# Patient Record
Sex: FEMALE | Employment: FULL TIME | URBAN - METROPOLITAN AREA
[De-identification: names, ages, dates, MRNs, and addresses within clinical notes are randomized per-mention and may not be internally consistent; named-entity substitution may affect disease eponyms.]

---

## 2017-03-01 ENCOUNTER — OFFICE VISIT (OUTPATIENT)
Dept: FAMILY MEDICINE CLINIC | Facility: CLINIC | Age: 37
End: 2017-03-01

## 2017-03-01 VITALS
RESPIRATION RATE: 16 BRPM | TEMPERATURE: 98 F | BODY MASS INDEX: 37.68 KG/M2 | WEIGHT: 210 LBS | HEART RATE: 62 BPM | SYSTOLIC BLOOD PRESSURE: 120 MMHG | OXYGEN SATURATION: 98 % | HEIGHT: 62.5 IN | DIASTOLIC BLOOD PRESSURE: 78 MMHG

## 2017-03-01 DIAGNOSIS — J06.9 VIRAL UPPER RESPIRATORY TRACT INFECTION: Primary | ICD-10-CM

## 2017-03-01 DIAGNOSIS — H65.03 BILATERAL ACUTE SEROUS OTITIS MEDIA, RECURRENCE NOT SPECIFIED: ICD-10-CM

## 2017-03-01 PROCEDURE — 99203 OFFICE O/P NEW LOW 30 MIN: CPT | Performed by: NURSE PRACTITIONER

## 2017-03-01 RX ORDER — FLUTICASONE PROPIONATE 50 MCG
2 SPRAY, SUSPENSION (ML) NASAL DAILY
Qty: 1 BOTTLE | Refills: 0 | Status: SHIPPED | OUTPATIENT
Start: 2017-03-01 | End: 2017-03-22

## 2017-03-02 NOTE — PROGRESS NOTES
CHIEF COMPLAINT:   Patient presents with:  Cerumen Impaction: This is a 36 yo famle here to check left ear may have wax Recent URI      HPI:   Eliesermalik Gupta is a 31year old female who presents for upper respiratory symptoms for  1 weeks.  Patient repor auricular lymphadenopathy. ASSESSMENT AND PLAN:   Tu Salazar is a 31year old female who presents with URI eustachian tube dysfunction    ASSESSMENT:   eustachian tube dysfunction  URI      PLAN: Meds as below.   Comfort care as described

## 2017-03-06 ENCOUNTER — TELEPHONE (OUTPATIENT)
Dept: FAMILY MEDICINE CLINIC | Facility: CLINIC | Age: 37
End: 2017-03-06

## 2017-12-18 ENCOUNTER — OFFICE VISIT (OUTPATIENT)
Dept: FAMILY MEDICINE CLINIC | Facility: CLINIC | Age: 37
End: 2017-12-18

## 2017-12-18 VITALS
SYSTOLIC BLOOD PRESSURE: 122 MMHG | OXYGEN SATURATION: 98 % | BODY MASS INDEX: 38.22 KG/M2 | HEIGHT: 62.5 IN | HEART RATE: 84 BPM | DIASTOLIC BLOOD PRESSURE: 80 MMHG | RESPIRATION RATE: 20 BRPM | TEMPERATURE: 98 F | WEIGHT: 213 LBS

## 2017-12-18 DIAGNOSIS — J00 NASOPHARYNGITIS ACUTE: Primary | ICD-10-CM

## 2017-12-18 PROCEDURE — 99213 OFFICE O/P EST LOW 20 MIN: CPT | Performed by: NURSE PRACTITIONER

## 2017-12-18 NOTE — PROGRESS NOTES
CHIEF COMPLAINT:     Patient presents with:  Sinus Problem      HPI:   Anthony Lewis is a 30year old female who presents with complaints of sinus congestion (improving), with swollen \"glands\"      No current outpatient prescriptions on file.    Past Instructions on file for this visit. The patient indicates understanding of these issues and agrees to the plan. The patient is asked to return in 3 days if no improvement or sooner if condition worsens.

## 2018-04-02 ENCOUNTER — OFFICE VISIT (OUTPATIENT)
Dept: FAMILY MEDICINE CLINIC | Facility: CLINIC | Age: 38
End: 2018-04-02

## 2018-04-02 VITALS
TEMPERATURE: 98 F | DIASTOLIC BLOOD PRESSURE: 70 MMHG | OXYGEN SATURATION: 98 % | BODY MASS INDEX: 38 KG/M2 | HEART RATE: 73 BPM | WEIGHT: 213 LBS | SYSTOLIC BLOOD PRESSURE: 120 MMHG

## 2018-04-02 DIAGNOSIS — R21 RASH: Primary | ICD-10-CM

## 2018-04-02 PROCEDURE — 99213 OFFICE O/P EST LOW 20 MIN: CPT | Performed by: FAMILY MEDICINE

## 2018-04-02 RX ORDER — MOMETASONE FUROATE 1 MG/G
1 CREAM TOPICAL 2 TIMES DAILY PRN
Qty: 15 G | Refills: 0 | Status: SHIPPED | OUTPATIENT
Start: 2018-04-02 | End: 2019-03-28

## 2018-04-02 RX ORDER — CHOLECALCIFEROL (VITAMIN D3) 25 MCG
1 TABLET,CHEWABLE ORAL DAILY
COMMUNITY

## 2018-04-02 RX ORDER — CHLORAL HYDRATE 500 MG
1000 CAPSULE ORAL DAILY
COMMUNITY
End: 2020-01-02

## 2018-04-02 NOTE — PATIENT INSTRUCTIONS
Monitor symptoms. Try applying coconut oil and lavender oil to area 2-3 times daily. If no better consider adding claritin/zyrtec or trying the topical steroid cream twice daily.    Consider looking into [facility] website or [facility] paleo c

## 2018-04-03 NOTE — PROGRESS NOTES
CHIEF COMPLAINT:   Patient presents with:  Rash Skin Problem (integumentary)         HPI:   Kennedy Marques is a 30year old female who presents for evaluation of a rash. Per patient rash started in the past 3 weeks.  Rash has been persistent or slightly enlargement of the lymph nodes. NEURO: Denies abnormal sensation, tingling of the skin, or numbness.       EXAM:   /70   Pulse 73   Temp 98.4 °F (36.9 °C) (Oral)   Wt 213 lb   SpO2 98%   BMI 38.34 kg/m²   GENERAL: well developed, well nourished,in no

## 2019-04-15 ENCOUNTER — HOSPITAL ENCOUNTER (OUTPATIENT)
Age: 39
Discharge: HOME | End: 2019-04-15
Attending: FAMILY MEDICINE
Payer: COMMERCIAL

## 2019-04-15 VITALS
RESPIRATION RATE: 16 BRPM | HEART RATE: 72 BPM | SYSTOLIC BLOOD PRESSURE: 132 MMHG | TEMPERATURE: 98 F | DIASTOLIC BLOOD PRESSURE: 79 MMHG | OXYGEN SATURATION: 100 %

## 2019-04-15 DIAGNOSIS — J02.0 STREP PHARYNGITIS: Primary | ICD-10-CM

## 2019-04-15 PROCEDURE — 99213 OFFICE O/P EST LOW 20 MIN: CPT

## 2019-04-15 PROCEDURE — 99204 OFFICE O/P NEW MOD 45 MIN: CPT

## 2019-04-15 PROCEDURE — 87430 STREP A AG IA: CPT

## 2019-04-15 RX ORDER — AMOXICILLIN 875 MG/1
875 TABLET, COATED ORAL 2 TIMES DAILY
Qty: 20 TABLET | Refills: 0 | Status: SHIPPED | OUTPATIENT
Start: 2019-04-15 | End: 2019-04-25

## 2019-04-15 NOTE — ED PROVIDER NOTES
Patient Seen in: [address]    History   Patient presents with:  Sore Throat    Stated Complaint: TL-Throat    HPI    Patient here with sore throat for 1 days. No travel,no sick contacts .   Patient denies sig shortness non-tender, normal bowel sounds    DDX: strep vs. Viral pharyngitis vs. uri    ED Course     Labs Reviewed   [facility] POCT RAPID STREP - Abnormal; Notable for the following components:       Result Value    POCT Rapid Strep Positive (*)     All other components

## 2019-04-30 ENCOUNTER — HOSPITAL ENCOUNTER (OUTPATIENT)
Age: 39
Discharge: HOME | End: 2019-04-30
Attending: FAMILY MEDICINE
Payer: COMMERCIAL

## 2019-04-30 VITALS
OXYGEN SATURATION: 100 % | HEART RATE: 64 BPM | WEIGHT: 215 LBS | BODY MASS INDEX: 39.56 KG/M2 | TEMPERATURE: 98 F | SYSTOLIC BLOOD PRESSURE: 129 MMHG | HEIGHT: 62 IN | DIASTOLIC BLOOD PRESSURE: 76 MMHG | RESPIRATION RATE: 18 BRPM

## 2019-04-30 DIAGNOSIS — R59.0 ANTERIOR CERVICAL LYMPHADENOPATHY: Primary | ICD-10-CM

## 2019-04-30 PROCEDURE — 99212 OFFICE O/P EST SF 10 MIN: CPT

## 2019-04-30 PROCEDURE — 99213 OFFICE O/P EST LOW 20 MIN: CPT

## 2019-04-30 NOTE — ED INITIAL ASSESSMENT (HPI)
Pt presents to the IC with c/o a sore throat and enlarged tonsils. Pt states she was here on 4/15 and was dx with strep. Pt notes she completed the prescribed amoxicillin for 10 days and felt better, but symptoms did not completely resolve.  Pt did not foll

## 2019-05-01 NOTE — ED PROVIDER NOTES
Patient presents with:  Sore Throat      HPI:     Gina Martinez is a 41year old female who presents with for chief complaint of swollen tonsil   X 2 days. Recently treated for strep and finish course of antibiotic.   Denies symptoms of fevers, chills, deviation. Mild right submandibular adenopathy present. No thyromegaly present. 3. Skin: Skin is warm and dry. Capillary refill takes less than 2 seconds. No rash noted. not diaphoretic. No erythema. 4. Psychiatric: normal mood and affect.  behavior i

## 2019-10-26 ENCOUNTER — HOSPITAL ENCOUNTER (OUTPATIENT)
Age: 39
Discharge: HOME | End: 2019-10-26
Attending: FAMILY MEDICINE
Payer: COMMERCIAL

## 2019-10-26 VITALS
BODY MASS INDEX: 39 KG/M2 | SYSTOLIC BLOOD PRESSURE: 131 MMHG | HEART RATE: 61 BPM | DIASTOLIC BLOOD PRESSURE: 80 MMHG | HEIGHT: 62.5 IN | TEMPERATURE: 99 F | OXYGEN SATURATION: 97 % | RESPIRATION RATE: 16 BRPM

## 2019-10-26 DIAGNOSIS — J02.9 ACUTE PHARYNGITIS, UNSPECIFIED ETIOLOGY: Primary | ICD-10-CM

## 2019-10-26 PROCEDURE — 87081 CULTURE SCREEN ONLY: CPT | Performed by: FAMILY MEDICINE

## 2019-10-26 PROCEDURE — 99213 OFFICE O/P EST LOW 20 MIN: CPT

## 2019-10-26 PROCEDURE — 99214 OFFICE O/P EST MOD 30 MIN: CPT

## 2019-10-26 PROCEDURE — 87430 STREP A AG IA: CPT | Performed by: FAMILY MEDICINE

## 2019-10-26 NOTE — ED PROVIDER NOTES
Patient Seen in: [address] [facility]      History   Patient presents with:  Sore Throat    Stated Complaint: sore throat x 4 days    HPI    17-year-old female presents with complaints of sore throat for the past 3 days.   She reports Salt water gargles   If has worsening symptoms, drooling from mouth and unable to swallow go to ER                   Disposition and Plan     Clinical Impression:  Acute pharyngitis, unspecified etiology  (primary encounter diagnosis)    Disposition:  Kareen Ramirez

## 2019-10-26 NOTE — ED INITIAL ASSESSMENT (HPI)
Pt c/o right sided sore throat x 3-4 days, coughing up some mucous at night at times. Denies other s/s.

## 2020-01-02 ENCOUNTER — OFFICE VISIT (OUTPATIENT)
Dept: INTERNAL MEDICINE CLINIC | Facility: CLINIC | Age: 40
End: 2020-01-02
Payer: COMMERCIAL

## 2020-01-02 VITALS
DIASTOLIC BLOOD PRESSURE: 88 MMHG | RESPIRATION RATE: 16 BRPM | TEMPERATURE: 100 F | HEIGHT: 62.6 IN | OXYGEN SATURATION: 98 % | SYSTOLIC BLOOD PRESSURE: 128 MMHG | BODY MASS INDEX: 41.45 KG/M2 | HEART RATE: 78 BPM | WEIGHT: 231 LBS

## 2020-01-02 DIAGNOSIS — J98.01 BRONCHOSPASM: ICD-10-CM

## 2020-01-02 DIAGNOSIS — H66.002 NON-RECURRENT ACUTE SUPPURATIVE OTITIS MEDIA OF LEFT EAR WITHOUT SPONTANEOUS RUPTURE OF TYMPANIC MEMBRANE: Primary | ICD-10-CM

## 2020-01-02 PROBLEM — J00 NASOPHARYNGITIS ACUTE: Status: RESOLVED | Noted: 2017-12-18 | Resolved: 2020-01-02

## 2020-01-02 PROCEDURE — 99204 OFFICE O/P NEW MOD 45 MIN: CPT | Performed by: INTERNAL MEDICINE

## 2020-01-02 RX ORDER — ALBUTEROL SULFATE 90 UG/1
2 AEROSOL, METERED RESPIRATORY (INHALATION) EVERY 6 HOURS
Qty: 1 INHALER | Refills: 0 | Status: SHIPPED | OUTPATIENT
Start: 2020-01-02 | End: 2020-02-17

## 2020-01-02 RX ORDER — PREDNISONE 20 MG/1
TABLET ORAL
Qty: 14 TABLET | Refills: 0 | Status: SHIPPED | OUTPATIENT
Start: 2020-01-02 | End: 2020-07-14

## 2020-01-02 NOTE — PATIENT INSTRUCTIONS
Bronchospasm (Adult)    Bronchospasm occurs when the airways (bronchial tubes) go into spasm and contract. This makes it hard to breathe and causes wheezing (a high-pitched whistling sound). Bronchospasm can also cause frequent coughing without wheezing. (influenza vaccine).   When to seek medical advice  Call your healthcare provider right away if any of these occur:  · You need to use your inhalers more often than usual  · Fever of 100.4°F (38°C) or higher, or as directed by your healthcare provider  · Co

## 2020-01-03 NOTE — PROGRESS NOTES
HPI:    Patient ID: Love Myrick is a 42year old female. Cough   This is a new problem. The current episode started 1 to 4 weeks ago. The problem has been gradually worsening. The problem occurs every few minutes.  The cough is productive of sputum Comment:As child unkown reaction   PHYSICAL EXAM:   Physical Exam   Constitutional: She appears well-developed and well-nourished. No distress. HENT:   Head: Normocephalic and atraumatic.    Right Ear: Hearing, tympanic membrane and external ear nor

## 2020-02-15 DIAGNOSIS — J98.01 BRONCHOSPASM: ICD-10-CM

## 2020-02-17 RX ORDER — ALBUTEROL SULFATE 90 UG/1
2 AEROSOL, METERED RESPIRATORY (INHALATION) EVERY 6 HOURS
Qty: 6.7 G | Refills: 0 | Status: SHIPPED | OUTPATIENT
Start: 2020-02-17 | End: 2020-07-14

## 2020-07-14 ENCOUNTER — OFFICE VISIT (OUTPATIENT)
Dept: INTERNAL MEDICINE CLINIC | Facility: CLINIC | Age: 40
End: 2020-07-14
Payer: COMMERCIAL

## 2020-07-14 VITALS
SYSTOLIC BLOOD PRESSURE: 124 MMHG | WEIGHT: 240 LBS | OXYGEN SATURATION: 98 % | DIASTOLIC BLOOD PRESSURE: 80 MMHG | HEART RATE: 70 BPM | TEMPERATURE: 98 F | RESPIRATION RATE: 16 BRPM | BODY MASS INDEX: 43.06 KG/M2 | HEIGHT: 62.5 IN

## 2020-07-14 DIAGNOSIS — Z00.00 ANNUAL PHYSICAL EXAM: Primary | ICD-10-CM

## 2020-07-14 DIAGNOSIS — Z13.89 SCREENING FOR GENITOURINARY CONDITION: ICD-10-CM

## 2020-07-14 DIAGNOSIS — Z13.0 SCREENING, IRON DEFICIENCY ANEMIA: ICD-10-CM

## 2020-07-14 DIAGNOSIS — Z13.220 LIPID SCREENING: ICD-10-CM

## 2020-07-14 DIAGNOSIS — Z00.00 LABORATORY EXAMINATION ORDERED AS PART OF A ROUTINE GENERAL MEDICAL EXAMINATION: ICD-10-CM

## 2020-07-14 DIAGNOSIS — Z13.29 THYROID DISORDER SCREEN: ICD-10-CM

## 2020-07-14 PROBLEM — K58.8 OTHER IRRITABLE BOWEL SYNDROME: Status: RESOLVED | Noted: 2020-07-14 | Resolved: 2020-07-14

## 2020-07-14 PROBLEM — K58.1 IRRITABLE BOWEL SYNDROME WITH CONSTIPATION: Status: ACTIVE | Noted: 2020-07-14

## 2020-07-14 PROBLEM — K58.8 OTHER IRRITABLE BOWEL SYNDROME: Status: ACTIVE | Noted: 2020-07-14

## 2020-07-14 PROCEDURE — 99395 PREV VISIT EST AGE 18-39: CPT | Performed by: INTERNAL MEDICINE

## 2020-07-14 NOTE — PROGRESS NOTES
HPI:    Patient ID: Mati Sanchez is a 42year old female. HPI  HPI:   Mati Sanchez is a 42year old female who presents for a complete physical exam. Symptoms: denies discharge, itching, burning or dysuria, periods are regular.  Patient complai denies any unusual skin lesions  EYES:denies blurred vision or double vision  HEENT: denies nasal congestion, sinus pain or ST  LUNGS: denies shortness of breath with exertion  CARDIOVASCULAR: denies chest pain on exertion  GI: denies abdominal pain,denies ezequiel    Review of Systems         Current Outpatient Medications   Medication Sig Dispense Refill   • Cod Liver Oil 1000 MG Oral Cap Take by mouth daily. • Cyanocobalamin (VITAMIN B 12 OR) Take by mouth daily.        • Calcium Carbonate (CALCIUM 600) 15

## 2020-07-14 NOTE — PATIENT INSTRUCTIONS
Prevention Guidelines, Women Ages 25 to 44  Screening tests and vaccines are an important part of managing your health. A screening test is done to find possible disorders or diseases in people who don't have any symptoms.  The goal is to find a disease e diabetes Lifelong testing every 3 years   Type 2 diabetes All women with prediabetes Every year   Gonorrhea Sexually active women at increased risk for infection At routine exams   Hepatitis C Anyone at increased risk At routine exams   HIV All women jie Meningococcal Women at increased risk for infection should talk with their healthcare provider 1 or more doses   Pneumococcal conjugate vaccine (PCV13) and pneumococcal polysaccharide vaccine (PPSV23) Women at increased risk for infection should talk with instructions.

## 2020-07-26 LAB
ABSOLUTE BASOPHILS: 30 CELLS/UL (ref 0–200)
ABSOLUTE EOSINOPHILS: 102 CELLS/UL (ref 15–500)
ABSOLUTE LYMPHOCYTES: 2022 CELLS/UL (ref 850–3900)
ABSOLUTE MONOCYTES: 438 CELLS/UL (ref 200–950)
ABSOLUTE NEUTROPHILS: 3408 CELLS/UL (ref 1500–7800)
ALBUMIN/GLOBULIN RATIO: 1.8 (CALC) (ref 1–2.5)
ALBUMIN: 4.2 G/DL (ref 3.6–5.1)
ALKALINE PHOSPHATASE: 62 U/L (ref 31–125)
ALT: 18 U/L (ref 6–29)
APPEARANCE: CLEAR
AST: 17 U/L (ref 10–30)
BASOPHILS: 0.5 %
BILIRUBIN, TOTAL: 0.3 MG/DL (ref 0.2–1.2)
BILIRUBIN: NEGATIVE
BUN: 10 MG/DL (ref 7–25)
CALCIUM: 9.4 MG/DL (ref 8.6–10.2)
CARBON DIOXIDE: 29 MMOL/L (ref 20–32)
CHLORIDE: 105 MMOL/L (ref 98–110)
CHOL/HDLC RATIO: 3.5 (CALC)
CHOLESTEROL, TOTAL: 220 MG/DL
COLOR: YELLOW
CREATININE: 0.87 MG/DL (ref 0.5–1.1)
EGFR IF AFRICN AM: 99 ML/MIN/1.73M2
EGFR IF NONAFRICN AM: 85 ML/MIN/1.73M2
EOSINOPHILS: 1.7 %
GLOBULIN: 2.3 G/DL (CALC) (ref 1.9–3.7)
GLUCOSE: 96 MG/DL (ref 65–99)
GLUCOSE: NEGATIVE
HDL CHOLESTEROL: 63 MG/DL
HEMATOCRIT: 43.6 % (ref 35–45)
HEMOGLOBIN A1C: 5.1 % OF TOTAL HGB
HEMOGLOBIN: 14.2 G/DL (ref 11.7–15.5)
KETONES: NEGATIVE
LDL-CHOLESTEROL: 133 MG/DL (CALC)
LEUKOCYTE ESTERASE: NEGATIVE
LYMPHOCYTES: 33.7 %
MCH: 28.5 PG (ref 27–33)
MCHC: 32.6 G/DL (ref 32–36)
MCV: 87.4 FL (ref 80–100)
MONOCYTES: 7.3 %
MPV: 8.9 FL (ref 7.5–12.5)
NEUTROPHILS: 56.8 %
NITRITE: NEGATIVE
NON-HDL CHOLESTEROL: 157 MG/DL (CALC)
OCCULT BLOOD: NEGATIVE
PH: 5.5 (ref 5–8)
PLATELET COUNT: 297 THOUSAND/UL (ref 140–400)
POTASSIUM: 4.1 MMOL/L (ref 3.5–5.3)
PROTEIN, TOTAL: 6.5 G/DL (ref 6.1–8.1)
PROTEIN: NEGATIVE
RDW: 13.8 % (ref 11–15)
RED BLOOD CELL COUNT: 4.99 MILLION/UL (ref 3.8–5.1)
SODIUM: 140 MMOL/L (ref 135–146)
SPECIFIC GRAVITY: 1.03 (ref 1–1.03)
T4, FREE: 0.9 NG/DL (ref 0.8–1.8)
TRIGLYCERIDES: 127 MG/DL
TSH W/REFLEX TO FT4: 5.29 MIU/L
WHITE BLOOD CELL COUNT: 6 THOUSAND/UL (ref 3.8–10.8)

## 2020-10-19 ENCOUNTER — OFFICE VISIT (OUTPATIENT)
Dept: INTERNAL MEDICINE CLINIC | Facility: CLINIC | Age: 40
End: 2020-10-19
Payer: COMMERCIAL

## 2020-10-19 VITALS
RESPIRATION RATE: 18 BRPM | TEMPERATURE: 99 F | BODY MASS INDEX: 43 KG/M2 | DIASTOLIC BLOOD PRESSURE: 76 MMHG | SYSTOLIC BLOOD PRESSURE: 118 MMHG | HEART RATE: 75 BPM | OXYGEN SATURATION: 98 % | WEIGHT: 240 LBS

## 2020-10-19 DIAGNOSIS — Z01.419 ENCOUNTER FOR GYNECOLOGICAL EXAMINATION WITHOUT ABNORMAL FINDING: ICD-10-CM

## 2020-10-19 DIAGNOSIS — Z31.69 ENCOUNTER FOR PRECONCEPTION CONSULTATION: Primary | ICD-10-CM

## 2020-10-19 DIAGNOSIS — Z80.3 FAMILY HISTORY OF BREAST CANCER: ICD-10-CM

## 2020-10-19 DIAGNOSIS — Z12.4 SCREENING FOR MALIGNANT NEOPLASM OF CERVIX: ICD-10-CM

## 2020-10-19 PROCEDURE — 90686 IIV4 VACC NO PRSV 0.5 ML IM: CPT | Performed by: PHYSICIAN ASSISTANT

## 2020-10-19 PROCEDURE — 90471 IMMUNIZATION ADMIN: CPT | Performed by: PHYSICIAN ASSISTANT

## 2020-10-19 PROCEDURE — 3078F DIAST BP <80 MM HG: CPT | Performed by: PHYSICIAN ASSISTANT

## 2020-10-19 PROCEDURE — 99213 OFFICE O/P EST LOW 20 MIN: CPT | Performed by: PHYSICIAN ASSISTANT

## 2020-10-19 PROCEDURE — 88175 CYTOPATH C/V AUTO FLUID REDO: CPT | Performed by: PHYSICIAN ASSISTANT

## 2020-10-19 PROCEDURE — 87624 HPV HI-RISK TYP POOLED RSLT: CPT | Performed by: PHYSICIAN ASSISTANT

## 2020-10-19 PROCEDURE — 3074F SYST BP LT 130 MM HG: CPT | Performed by: PHYSICIAN ASSISTANT

## 2020-10-19 NOTE — PATIENT INSTRUCTIONS
Continue healthy lifestyle    Improving Your Fertility  Your healthcare provider may suggest some simple methods to help you get pregnant. Most focus on predicting ovulation. This is the time when sex has the best chance of success.  Your healthcare provide count.  Medicines, supplements, and herbal remedies  Medicines, supplements, and herbal remedies can affect hormone levels in men and women. They can also affect the quantity and quality of sperm.  Be sure to tell your healthcare provider about any substanc the list below. The more items that describe you, the healthier you may be:  · I eat a balanced diet. · I keep physically active. · I have my health problems under control. · My weight is about right. · I don’t smoke. · I don’t use recreational drugs.

## 2020-10-20 ENCOUNTER — PATIENT MESSAGE (OUTPATIENT)
Dept: INTERNAL MEDICINE CLINIC | Facility: CLINIC | Age: 40
End: 2020-10-20

## 2020-10-29 NOTE — TELEPHONE ENCOUNTER
From: Nydia Childress  Sent: 10/28/2020 8:26 PM CDT  To: [facility] Clinical Staff  Subject: RE: Other    HI Can you assist?  I just check my claim and it is recorded as an office visit as opposed to a pap smear. Is it possible to adjust the claim correctly?

## 2021-03-21 ENCOUNTER — PATIENT MESSAGE (OUTPATIENT)
Dept: INTERNAL MEDICINE CLINIC | Facility: CLINIC | Age: 41
End: 2021-03-21

## 2021-03-22 NOTE — TELEPHONE ENCOUNTER
From: Anthony Lewis  To: Maryjane Russell MD  Sent: 3/21/2021 1:06 PM CDT  Subject: Other    Am scheduled to take the covid vaccine with my employer and I do not feel 100% comfortable can a nurse call me to discuss

## 2021-03-23 ENCOUNTER — PATIENT MESSAGE (OUTPATIENT)
Dept: INTERNAL MEDICINE CLINIC | Facility: CLINIC | Age: 41
End: 2021-03-23

## 2021-03-24 NOTE — TELEPHONE ENCOUNTER
From: Nydia Childress  To: Matt Harvey MD  Sent: 3/23/2021 11:32 PM CDT  Subject: Radha Machado, thanks for the call yesterday. Just want my medical records to be updated. I got the Pzifer vaccine on 3/23 and scheduled for the 2nd dose on 4/13.

## 2021-11-04 ENCOUNTER — OFFICE VISIT (OUTPATIENT)
Dept: INTERNAL MEDICINE CLINIC | Facility: CLINIC | Age: 41
End: 2021-11-04
Payer: COMMERCIAL

## 2021-11-04 VITALS
TEMPERATURE: 98 F | HEIGHT: 62.5 IN | RESPIRATION RATE: 12 BRPM | OXYGEN SATURATION: 98 % | SYSTOLIC BLOOD PRESSURE: 126 MMHG | HEART RATE: 73 BPM | DIASTOLIC BLOOD PRESSURE: 68 MMHG | BODY MASS INDEX: 41.09 KG/M2 | WEIGHT: 229 LBS

## 2021-11-04 DIAGNOSIS — Z23 NEED FOR INFLUENZA VACCINATION: ICD-10-CM

## 2021-11-04 DIAGNOSIS — Z00.00 LABORATORY EXAMINATION ORDERED AS PART OF A ROUTINE GENERAL MEDICAL EXAMINATION: ICD-10-CM

## 2021-11-04 DIAGNOSIS — Z13.0 SCREENING, IRON DEFICIENCY ANEMIA: ICD-10-CM

## 2021-11-04 DIAGNOSIS — Z13.220 LIPID SCREENING: ICD-10-CM

## 2021-11-04 DIAGNOSIS — Z13.29 THYROID DISORDER SCREEN: ICD-10-CM

## 2021-11-04 DIAGNOSIS — Z00.00 ANNUAL PHYSICAL EXAM: Primary | ICD-10-CM

## 2021-11-04 DIAGNOSIS — Z13.89 SCREENING FOR GENITOURINARY CONDITION: ICD-10-CM

## 2021-11-04 PROBLEM — E03.8 SUBCLINICAL HYPOTHYROIDISM: Status: ACTIVE | Noted: 2021-11-04

## 2021-11-04 PROCEDURE — 90471 IMMUNIZATION ADMIN: CPT | Performed by: INTERNAL MEDICINE

## 2021-11-04 PROCEDURE — 3074F SYST BP LT 130 MM HG: CPT | Performed by: INTERNAL MEDICINE

## 2021-11-04 PROCEDURE — 3008F BODY MASS INDEX DOCD: CPT | Performed by: INTERNAL MEDICINE

## 2021-11-04 PROCEDURE — 3078F DIAST BP <80 MM HG: CPT | Performed by: INTERNAL MEDICINE

## 2021-11-04 PROCEDURE — 99395 PREV VISIT EST AGE 18-39: CPT | Performed by: INTERNAL MEDICINE

## 2021-11-04 PROCEDURE — 90686 IIV4 VACC NO PRSV 0.5 ML IM: CPT | Performed by: INTERNAL MEDICINE

## 2021-11-04 NOTE — PROGRESS NOTES
Subjective:   Patient ID: Jaylyn Miguel is a 46year old female. HPI   HPI:   Jaylyn Miguel is a 46year old female who presents for a complete physical exam. Symptoms: denies discharge, itching, burning or dysuria, periods are regular.  Patient Calcium Carbonate 1500 (600 Ca) MG Oral Tab Take by mouth daily. • Polyethylene Glycol 3350 (MIRALAX OR) Take by mouth. • prenatal multivitamin plus DHA 27-0.8-228 MG Oral Cap Take 1 capsule by mouth daily.         Past Medical History:   [place] normal optic disk,conjunctiva are clear  NECK: supple,no adenopathy,no bruits  LUNGS: clear to auscultation  CARDIO: RRR without murmur  GI: good BS's,no masses, HSM or tenderness  :deferred to gyne  MUSCULOSKELETAL: back is not tender,  EXTREMITIES: no diagnosis)  Lipid screening  Screening for genitourinary condition  Screening, iron deficiency anemia  Laboratory examination ordered as part of a routine general medical examination  Thyroid disorder screen  Need for influenza vaccination    Orders Placed

## 2021-12-30 ENCOUNTER — TELEPHONE (OUTPATIENT)
Dept: INTERNAL MEDICINE CLINIC | Facility: CLINIC | Age: 41
End: 2021-12-30

## 2021-12-30 NOTE — TELEPHONE ENCOUNTER
Patient currently on menstrual cycle and inquiring if it is okay to hold off on doing UA until cycle is completed. Notified patient she can complete UA after menstrual cycle is completed.   Patient also states she began to develop muscle tightness in her le

## 2022-08-03 ENCOUNTER — TELEMEDICINE (OUTPATIENT)
Dept: INTERNAL MEDICINE CLINIC | Facility: CLINIC | Age: 42
End: 2022-08-03
Payer: COMMERCIAL

## 2022-08-03 DIAGNOSIS — R39.12 WEAK URINARY STREAM: Primary | ICD-10-CM

## 2022-08-03 DIAGNOSIS — K58.1 IRRITABLE BOWEL SYNDROME WITH CONSTIPATION: ICD-10-CM

## 2022-08-03 PROCEDURE — 99214 OFFICE O/P EST MOD 30 MIN: CPT | Performed by: PHYSICIAN ASSISTANT

## 2022-08-05 ENCOUNTER — PATIENT MESSAGE (OUTPATIENT)
Dept: INTERNAL MEDICINE CLINIC | Facility: CLINIC | Age: 42
End: 2022-08-05

## 2022-08-05 LAB
APPEARANCE: CLEAR
BILIRUBIN: NEGATIVE
COLOR: YELLOW
GLUCOSE: NEGATIVE
KETONES: NEGATIVE
LEUKOCYTE ESTERASE: NEGATIVE
NITRITE: NEGATIVE
OCCULT BLOOD: NEGATIVE
PH: 6.5 (ref 5–8)
PROTEIN: NEGATIVE
SPECIFIC GRAVITY: 1.01 (ref 1–1.03)

## 2022-08-05 NOTE — TELEPHONE ENCOUNTER
Called [facility], only automated prompts available and based on information entered from pt's chart, automated prompt stated there were no results available for this pt.

## 2022-08-05 NOTE — TELEPHONE ENCOUNTER
From: Elie Cheng  Sent: 8/5/2022 9:25 AM CDT  To: [facility] Clinical Staff  Subject: Herbie Reynolds     To the [facility]

## 2022-11-09 ENCOUNTER — HOSPITAL ENCOUNTER (OUTPATIENT)
Dept: MAMMOGRAPHY | Age: 42
Discharge: HOME | End: 2022-11-09
Attending: INTERNAL MEDICINE
Payer: COMMERCIAL

## 2022-11-09 ENCOUNTER — OFFICE VISIT (OUTPATIENT)
Dept: INTERNAL MEDICINE CLINIC | Facility: CLINIC | Age: 42
End: 2022-11-09
Payer: COMMERCIAL

## 2022-11-09 VITALS
BODY MASS INDEX: 41.98 KG/M2 | RESPIRATION RATE: 16 BRPM | TEMPERATURE: 98 F | SYSTOLIC BLOOD PRESSURE: 128 MMHG | DIASTOLIC BLOOD PRESSURE: 80 MMHG | OXYGEN SATURATION: 98 % | WEIGHT: 234 LBS | HEIGHT: 62.5 IN | HEART RATE: 79 BPM

## 2022-11-09 DIAGNOSIS — K58.1 IRRITABLE BOWEL SYNDROME WITH CONSTIPATION: ICD-10-CM

## 2022-11-09 DIAGNOSIS — Z00.00 LABORATORY EXAM ORDERED AS PART OF ROUTINE GENERAL MEDICAL EXAMINATION: ICD-10-CM

## 2022-11-09 DIAGNOSIS — F41.8 SITUATIONAL ANXIETY: ICD-10-CM

## 2022-11-09 DIAGNOSIS — Z12.31 ENCOUNTER FOR SCREENING MAMMOGRAM FOR BREAST CANCER: ICD-10-CM

## 2022-11-09 DIAGNOSIS — E03.8 SUBCLINICAL HYPOTHYROIDISM: ICD-10-CM

## 2022-11-09 DIAGNOSIS — Z12.31 ENCOUNTER FOR SCREENING MAMMOGRAM FOR MALIGNANT NEOPLASM OF BREAST: ICD-10-CM

## 2022-11-09 DIAGNOSIS — Z00.00 ROUTINE PHYSICAL EXAMINATION: Primary | ICD-10-CM

## 2022-11-09 PROBLEM — M76.822 POSTERIOR TIBIAL TENDINITIS OF LEFT LEG: Status: ACTIVE | Noted: 2022-08-26

## 2022-11-09 PROCEDURE — 99396 PREV VISIT EST AGE 40-64: CPT | Performed by: PHYSICIAN ASSISTANT

## 2022-11-09 PROCEDURE — 3074F SYST BP LT 130 MM HG: CPT | Performed by: PHYSICIAN ASSISTANT

## 2022-11-09 PROCEDURE — 3008F BODY MASS INDEX DOCD: CPT | Performed by: PHYSICIAN ASSISTANT

## 2022-11-09 PROCEDURE — 3079F DIAST BP 80-89 MM HG: CPT | Performed by: PHYSICIAN ASSISTANT

## 2022-11-09 PROCEDURE — 77063 BREAST TOMOSYNTHESIS BI: CPT | Performed by: INTERNAL MEDICINE

## 2022-11-09 PROCEDURE — 77067 SCR MAMMO BI INCL CAD: CPT | Performed by: INTERNAL MEDICINE

## 2022-11-09 RX ORDER — CHLORAL HYDRATE 500 MG
1000 CAPSULE ORAL DAILY
COMMUNITY

## 2022-11-09 NOTE — PATIENT INSTRUCTIONS
Start metamucil or benefiber 1 scoop daily in a glass of water, in the morning  See dietician for low FODMAP diet consult   Establish with new therapist

## 2022-11-19 ENCOUNTER — PATIENT MESSAGE (OUTPATIENT)
Dept: INTERNAL MEDICINE CLINIC | Facility: CLINIC | Age: 42
End: 2022-11-19

## 2022-11-21 NOTE — TELEPHONE ENCOUNTER
From: Kenny Snyder  To: Dahlia Marte PA-C  Sent: 11/19/2022 7:47 PM CST  Subject: Dense breast tissue     Hello, I just got my results that my breasts are dense per the mammogram and they suggest the MBI so I wanted to know what this procedure do.  I have to get like authorization from the insurance before scheduling and if this procedure can be done at a different facility just so I can save on calls and have the results transferred to [facility] thank you and if you need to call me just call me on Monday at [phone]

## 2022-12-03 ENCOUNTER — PATIENT MESSAGE (OUTPATIENT)
Dept: INTERNAL MEDICINE CLINIC | Facility: CLINIC | Age: 42
End: 2022-12-03

## 2022-12-03 LAB
ABSOLUTE BASOPHILS: 42 CELLS/UL (ref 0–200)
ABSOLUTE EOSINOPHILS: 42 CELLS/UL (ref 15–500)
ABSOLUTE LYMPHOCYTES: 1998 CELLS/UL (ref 850–3900)
ABSOLUTE MONOCYTES: 390 CELLS/UL (ref 200–950)
ABSOLUTE NEUTROPHILS: 3528 CELLS/UL (ref 1500–7800)
ALBUMIN/GLOBULIN RATIO: 1.5 (CALC) (ref 1–2.5)
ALBUMIN: 4.1 G/DL (ref 3.6–5.1)
ALKALINE PHOSPHATASE: 58 U/L (ref 31–125)
ALT: 20 U/L (ref 6–29)
APPEARANCE: CLEAR
AST: 15 U/L (ref 10–30)
BASOPHILS: 0.7 %
BILIRUBIN, TOTAL: 0.4 MG/DL (ref 0.2–1.2)
BILIRUBIN: NEGATIVE
BUN: 12 MG/DL (ref 7–25)
CALCIUM: 9.3 MG/DL (ref 8.6–10.2)
CARBON DIOXIDE: 26 MMOL/L (ref 20–32)
CHLORIDE: 103 MMOL/L (ref 98–110)
CHOL/HDLC RATIO: 3.3 (CALC)
CHOLESTEROL, TOTAL: 211 MG/DL
COLOR: YELLOW
CREATININE: 0.77 MG/DL (ref 0.5–0.99)
EGFR: 100 ML/MIN/1.73M2
EOSINOPHILS: 0.7 %
GLOBULIN: 2.8 G/DL (CALC) (ref 1.9–3.7)
GLUCOSE: 90 MG/DL (ref 65–99)
GLUCOSE: NEGATIVE
HDL CHOLESTEROL: 63 MG/DL
HEMATOCRIT: 40.8 % (ref 35–45)
HEMOGLOBIN A1C: 5.2 % OF TOTAL HGB
HEMOGLOBIN: 13.5 G/DL (ref 11.7–15.5)
KETONES: NEGATIVE
LDL-CHOLESTEROL: 133 MG/DL (CALC)
LEUKOCYTE ESTERASE: NEGATIVE
LYMPHOCYTES: 33.3 %
MCH: 28.3 PG (ref 27–33)
MCHC: 33.1 G/DL (ref 32–36)
MCV: 85.5 FL (ref 80–100)
MONOCYTES: 6.5 %
MPV: 9.3 FL (ref 7.5–12.5)
NEUTROPHILS: 58.8 %
NITRITE: NEGATIVE
NON-HDL CHOLESTEROL: 148 MG/DL (CALC)
OCCULT BLOOD: NEGATIVE
PH: 7 (ref 5–8)
PLATELET COUNT: 347 THOUSAND/UL (ref 140–400)
POTASSIUM: 4.4 MMOL/L (ref 3.5–5.3)
PROTEIN, TOTAL: 6.9 G/DL (ref 6.1–8.1)
PROTEIN: NEGATIVE
RDW: 13.2 % (ref 11–15)
RED BLOOD CELL COUNT: 4.77 MILLION/UL (ref 3.8–5.1)
SODIUM: 137 MMOL/L (ref 135–146)
SPECIFIC GRAVITY: 1.02 (ref 1–1.03)
T4, FREE: 1.5 NG/DL (ref 0.8–1.8)
TRIGLYCERIDES: 59 MG/DL
TSH W/REFLEX TO FT4: 0.05 MIU/L
WHITE BLOOD CELL COUNT: 6 THOUSAND/UL (ref 3.8–10.8)

## 2022-12-05 ENCOUNTER — PATIENT MESSAGE (OUTPATIENT)
Dept: INTERNAL MEDICINE CLINIC | Facility: CLINIC | Age: 42
End: 2022-12-05

## 2022-12-05 ENCOUNTER — ORDER TRANSCRIPTION (OUTPATIENT)
Dept: ADMINISTRATIVE | Facility: HOSPITAL | Age: 42
End: 2022-12-05

## 2022-12-05 DIAGNOSIS — K58.1 IRRITABLE BOWEL SYNDROME WITH CONSTIPATION: Primary | ICD-10-CM

## 2022-12-05 DIAGNOSIS — E03.8 SUBCLINICAL HYPOTHYROIDISM: Primary | ICD-10-CM

## 2022-12-05 NOTE — TELEPHONE ENCOUNTER
From: Carolyn Castorena  Sent: 12/5/2022 11:56 AM CST  To: [facility] Clinical Staff  Subject: Question regarding TSH W REFLEX TO FREE T4    HI , This message is for Raquel. Thank you for speaking with me. Can you send a copy of my labs to [address] [place] [address] in [address] just in case, they do not have a copy? Thank you.

## 2022-12-05 NOTE — TELEPHONE ENCOUNTER
From: Rc Recinos  Sent: 12/5/2022 12:21 PM CST  To: [facility] Clinical Staff  Subject: Question regarding TSH W REFLEX TO FREE T4    Okay.  Thank Raquel. Appreciate this

## 2022-12-07 ENCOUNTER — HOSPITAL ENCOUNTER (OUTPATIENT)
Dept: ULTRASOUND IMAGING | Age: 42
Discharge: HOME | End: 2022-12-07
Attending: PHYSICIAN ASSISTANT
Payer: COMMERCIAL

## 2022-12-07 DIAGNOSIS — E03.8 SUBCLINICAL HYPOTHYROIDISM: ICD-10-CM

## 2022-12-07 PROCEDURE — 76536 US EXAM OF HEAD AND NECK: CPT | Performed by: PHYSICIAN ASSISTANT

## 2022-12-08 ENCOUNTER — PATIENT MESSAGE (OUTPATIENT)
Dept: INTERNAL MEDICINE CLINIC | Facility: CLINIC | Age: 42
End: 2022-12-08

## 2022-12-08 NOTE — TELEPHONE ENCOUNTER
From: Buckhead Mode  Sent: 12/8/2022 10:25 AM CST  To: [facility] Clinical Staff  Subject: Question regarding TSH W REFLEX TO FREE T4    Hello, Please disregard phone call request. I was able to schedule appt online.

## 2022-12-08 NOTE — TELEPHONE ENCOUNTER
From: Jessica Degroot  Sent: 12/7/2022 6:11 PM CST  To: [facility] Clinical Staff  Subject: Question regarding TSH W REFLEX TO FREE T4    Hello since , my ultrasound came back OK do I still need to update my blood work?

## 2022-12-09 ENCOUNTER — PATIENT MESSAGE (OUTPATIENT)
Dept: INTERNAL MEDICINE CLINIC | Facility: CLINIC | Age: 42
End: 2022-12-09

## 2022-12-09 ENCOUNTER — OFFICE VISIT (OUTPATIENT)
Dept: INTERNAL MEDICINE CLINIC | Facility: CLINIC | Age: 42
End: 2022-12-09
Payer: COMMERCIAL

## 2022-12-09 VITALS
HEART RATE: 78 BPM | OXYGEN SATURATION: 98 % | SYSTOLIC BLOOD PRESSURE: 126 MMHG | RESPIRATION RATE: 16 BRPM | TEMPERATURE: 98 F | DIASTOLIC BLOOD PRESSURE: 84 MMHG

## 2022-12-09 DIAGNOSIS — E05.90 HYPERTHYROIDISM: Primary | ICD-10-CM

## 2022-12-09 LAB
T3, FREE: 4.6 PG/ML (ref 2.3–4.2)
T3, TOTAL: 152 NG/DL (ref 76–181)
T4, FREE: 1.6 NG/DL (ref 0.8–1.8)

## 2022-12-09 PROCEDURE — 3079F DIAST BP 80-89 MM HG: CPT | Performed by: PHYSICIAN ASSISTANT

## 2022-12-09 PROCEDURE — 99214 OFFICE O/P EST MOD 30 MIN: CPT | Performed by: PHYSICIAN ASSISTANT

## 2022-12-09 PROCEDURE — 3074F SYST BP LT 130 MM HG: CPT | Performed by: PHYSICIAN ASSISTANT

## 2022-12-09 NOTE — TELEPHONE ENCOUNTER
From: Elie Cheng  Sent: 12/9/2022 3:00 PM CST  To: [facility] Clinical Staff  Subject: Question regarding TSH W REFLEX TO FREE T4    Hello the earliest endocrinologist appointment i was able to get was 3.7.23    Just thinking ahead  1. Will i need to take blood work about every 30 days until scene? 2. Can gluten be a contributing factor to hormones being out of balance?     Thank you

## 2022-12-09 NOTE — PATIENT INSTRUCTIONS
Continue benefiber, calcium, probiotic, vitamin D3  Stop other supplements for now   Repeat blood test in 1 month if you have not seen the endocrinologist yet     Dr Malini Canales and Dr Feliciano Mohamud are the endocrinologist through THE [facility]

## 2023-01-07 PROBLEM — E03.8 SUBCLINICAL HYPOTHYROIDISM: Status: RESOLVED | Noted: 2021-11-04 | Resolved: 2023-01-07

## 2023-01-07 LAB
T3, FREE: 6.5 PG/ML (ref 2.3–4.2)
T3, TOTAL: 198 NG/DL (ref 76–181)
T4, FREE: 1.9 NG/DL (ref 0.8–1.8)
TSH: <0.01 MIU/L

## 2023-01-24 ENCOUNTER — OFFICE VISIT (OUTPATIENT)
Dept: INTERNAL MEDICINE CLINIC | Facility: CLINIC | Age: 43
End: 2023-01-24
Payer: COMMERCIAL

## 2023-01-24 VITALS
BODY MASS INDEX: 41.98 KG/M2 | RESPIRATION RATE: 16 BRPM | WEIGHT: 234 LBS | HEART RATE: 88 BPM | TEMPERATURE: 98 F | DIASTOLIC BLOOD PRESSURE: 84 MMHG | HEIGHT: 62.5 IN | OXYGEN SATURATION: 98 % | SYSTOLIC BLOOD PRESSURE: 132 MMHG

## 2023-01-24 DIAGNOSIS — M79.661 RIGHT CALF PAIN: Primary | ICD-10-CM

## 2023-01-24 PROCEDURE — 99213 OFFICE O/P EST LOW 20 MIN: CPT

## 2023-01-24 PROCEDURE — 3075F SYST BP GE 130 - 139MM HG: CPT

## 2023-01-24 PROCEDURE — 3008F BODY MASS INDEX DOCD: CPT

## 2023-01-24 PROCEDURE — 3079F DIAST BP 80-89 MM HG: CPT

## 2023-01-29 ENCOUNTER — PATIENT MESSAGE (OUTPATIENT)
Dept: INTERNAL MEDICINE CLINIC | Facility: CLINIC | Age: 43
End: 2023-01-29

## 2023-01-30 NOTE — TELEPHONE ENCOUNTER
From: Ankita Palomino  To: Laurent Cordero PA-C  Sent: 1/29/2023 10:23 PM CST  Subject: 12.9.22 last appointment     Hello  I notice on my notes from our last appointment it stated I had subclinical hyperthyroidism but I always been told it was subclinical hypothyroidism. Please update this note.  Thank you

## 2023-02-14 ENCOUNTER — TELEPHONE (OUTPATIENT)
Dept: INTERNAL MEDICINE CLINIC | Facility: CLINIC | Age: 43
End: 2023-02-14

## 2023-02-14 ENCOUNTER — OFFICE VISIT (OUTPATIENT)
Dept: INTERNAL MEDICINE CLINIC | Facility: CLINIC | Age: 43
End: 2023-02-14
Payer: COMMERCIAL

## 2023-02-14 VITALS
OXYGEN SATURATION: 98 % | SYSTOLIC BLOOD PRESSURE: 138 MMHG | WEIGHT: 234 LBS | BODY MASS INDEX: 41.98 KG/M2 | TEMPERATURE: 98 F | HEART RATE: 88 BPM | RESPIRATION RATE: 16 BRPM | DIASTOLIC BLOOD PRESSURE: 98 MMHG | HEIGHT: 62.5 IN

## 2023-02-14 DIAGNOSIS — H69.83 DYSFUNCTION OF BOTH EUSTACHIAN TUBES: ICD-10-CM

## 2023-02-14 DIAGNOSIS — R03.0 ELEVATED BP WITHOUT DIAGNOSIS OF HYPERTENSION: ICD-10-CM

## 2023-02-14 DIAGNOSIS — H93.299: Primary | ICD-10-CM

## 2023-02-14 DIAGNOSIS — Z71.3 ENCOUNTER FOR WEIGHT LOSS COUNSELING: ICD-10-CM

## 2023-02-14 PROCEDURE — 3080F DIAST BP >= 90 MM HG: CPT

## 2023-02-14 PROCEDURE — 99214 OFFICE O/P EST MOD 30 MIN: CPT

## 2023-02-14 PROCEDURE — 3075F SYST BP GE 130 - 139MM HG: CPT

## 2023-02-14 PROCEDURE — 3008F BODY MASS INDEX DOCD: CPT

## 2023-02-14 RX ORDER — FLUTICASONE PROPIONATE 50 MCG
2 SPRAY, SUSPENSION (ML) NASAL DAILY
Qty: 11.1 ML | Refills: 0 | Status: SHIPPED | OUTPATIENT
Start: 2023-02-14 | End: 2024-02-09

## 2023-02-14 NOTE — TELEPHONE ENCOUNTER
Pt was originally scheduled for Friday but moved her appt up to tomorrow. She is now calling and wanted to come into with either Halima Reed or Dr. Sarah Toney. She is having a hard time hearing her heart beat when covering her ears, almost sounds like a wheezing. Please advise.

## 2023-02-14 NOTE — TELEPHONE ENCOUNTER
Pt states onset of \"squeaking and wheezing\" noise started on February 3rd. Patient states over the weekend she laid down to take a nap and heard a \"hissing sound. \" Denies any chest pain or discomfort, h/a or vision changes. Current HR is 86. Patient states she can hear the sound now. Denies any ear pain, decreased hearing or discharge from ear. Patient seen at an urgent care over the weekend and was referred to an ENT. Patient seen by [facility] ENT- Dr. Lui Mtz today. Per provider's documentation: Normal hearing and ear exam  Flonase trial for possible ETD. Patient states she does not want to start the Flonase and would like a second opinion. Does not want to wait until tomorrow to be seen in office. Requesting appt today. Appt scheduled for 4:30 today with ALTHEA Knowles.

## 2023-03-02 ENCOUNTER — TELEPHONE (OUTPATIENT)
Dept: INTERNAL MEDICINE CLINIC | Facility: CLINIC | Age: 43
End: 2023-03-02

## 2023-03-02 RX ORDER — METHIMAZOLE 5 MG/1
5 TABLET ORAL DAILY
Refills: 0 | COMMUNITY
Start: 2023-03-02

## 2023-03-02 NOTE — TELEPHONE ENCOUNTER
March 7th appt with Dr. Ivana Park, [facility] cardiologist. Pt is concerned, per patient has history of heart disease and hypertension in her family. Patient is still using the Flonase, as prescribed, but states she is not using the spray consistently. Patient has not been tracking her home blood pressures as she is unsure how to use the machine. OV rescheduled for 03/09 for f/u. Notified to continue Flonase as directed and to bring home BP machine in during OV, we will educate patient on how to properly use her home BP machine at time of visit. Patient verbalized understanding. Occupational Therapy: Branch    Physical Therapy: Branch    Speech Language Pathology:  Individual: 60 minutes.    Signed by: Katherine Bruton, SLP

## 2023-03-02 NOTE — TELEPHONE ENCOUNTER
Patient would like to discuss the noise she hears is still there. She made an appt to see cardiologist     Pt would like to discuss next steps w/ clinical     Patient would like a call at 1pm during her lunch break.     Thank you

## 2023-03-08 PROBLEM — E05.90 HYPERTHYROIDISM: Status: ACTIVE | Noted: 2023-03-08

## 2023-03-08 RX ORDER — METHIMAZOLE 5 MG/1
5 TABLET ORAL DAILY
COMMUNITY
Start: 2023-02-17 | End: 2023-03-09

## 2023-03-09 ENCOUNTER — OFFICE VISIT (OUTPATIENT)
Dept: INTERNAL MEDICINE CLINIC | Facility: CLINIC | Age: 43
End: 2023-03-09
Payer: COMMERCIAL

## 2023-03-09 VITALS
WEIGHT: 232.38 LBS | RESPIRATION RATE: 16 BRPM | DIASTOLIC BLOOD PRESSURE: 90 MMHG | SYSTOLIC BLOOD PRESSURE: 150 MMHG | HEART RATE: 76 BPM | OXYGEN SATURATION: 98 % | BODY MASS INDEX: 41.7 KG/M2 | HEIGHT: 62.5 IN

## 2023-03-09 DIAGNOSIS — I10 HYPERTENSION, UNSPECIFIED TYPE: Primary | ICD-10-CM

## 2023-03-09 DIAGNOSIS — E05.90 HYPERTHYROIDISM: ICD-10-CM

## 2023-03-09 DIAGNOSIS — R00.2 PALPITATION: ICD-10-CM

## 2023-03-09 PROCEDURE — 3077F SYST BP >= 140 MM HG: CPT

## 2023-03-09 PROCEDURE — 99214 OFFICE O/P EST MOD 30 MIN: CPT

## 2023-03-09 PROCEDURE — 3080F DIAST BP >= 90 MM HG: CPT

## 2023-03-09 PROCEDURE — 3008F BODY MASS INDEX DOCD: CPT

## 2023-03-09 NOTE — PATIENT INSTRUCTIONS
Start taking blood pressure medication every morning. Check your blood pressure an hour after taking the medication. If your blood pressure is less then 100/70 please notify the office or start experiencing dizziness/lightheaded.

## 2023-03-13 ENCOUNTER — PATIENT MESSAGE (OUTPATIENT)
Dept: INTERNAL MEDICINE CLINIC | Facility: CLINIC | Age: 43
End: 2023-03-13

## 2023-03-13 DIAGNOSIS — I10 HYPERTENSION, UNSPECIFIED TYPE: Primary | ICD-10-CM

## 2023-03-13 NOTE — TELEPHONE ENCOUNTER
From: Fadi Jade  To: Malcolm KEE Segovia  Sent: 3/13/2023 6:37 AM CDT  Subject: Bp question    Good morning     I have some questions and thoughts  1. Per Nurse Ferny Lamb i should take my measurements 1 hour after taking meds. However in between the time I wake up am getting ready for work and then need to leave out. So is it okay for me to take my pressure after i shower and get dressed? 1a. Or should I take when I get to work? 2. When should I see the effects of the meds of lowering my blood pressure? Also on Saturday I got a ron reading took it again 2 times and pressure was lowering. Yesterday my pressure where higher. My thoughts are maybe because I woke up later.  was too loud talking on phone. We had a disagreement which i know raise blood pressure. So I need a quiet private space. 3. Is this something okay if I take my pressure at work and in the evening? I want to be sure to take as good as I can so I can provide you all with my data points. Also am entering my data in [facility]. 4. If I shared can you all see or should I send as an attachment?     Thank you

## 2023-03-13 NOTE — TELEPHONE ENCOUNTER
Per Yonas OH check her BP when she get's back home. As long as she get's a reading once a day it does not matter what time. It would take about two weeks for BP to stabilize. I don't know about [place] OTR but tell her she could just type out her BP's in my chart (one per day) and send it to me. Let her know to send it to me at the end of the week what has she been getting. Thanks.

## 2023-03-14 ENCOUNTER — TELEPHONE (OUTPATIENT)
Dept: INTERNAL MEDICINE CLINIC | Facility: CLINIC | Age: 43
End: 2023-03-14

## 2023-03-14 ENCOUNTER — OFFICE VISIT (OUTPATIENT)
Dept: INTERNAL MEDICINE CLINIC | Facility: CLINIC | Age: 43
End: 2023-03-14
Payer: COMMERCIAL

## 2023-03-14 VITALS
WEIGHT: 231 LBS | DIASTOLIC BLOOD PRESSURE: 90 MMHG | HEIGHT: 62.5 IN | TEMPERATURE: 98 F | BODY MASS INDEX: 41.45 KG/M2 | OXYGEN SATURATION: 98 % | RESPIRATION RATE: 16 BRPM | HEART RATE: 84 BPM | SYSTOLIC BLOOD PRESSURE: 160 MMHG

## 2023-03-14 DIAGNOSIS — I10 HYPERTENSION, UNSPECIFIED TYPE: Primary | ICD-10-CM

## 2023-03-14 DIAGNOSIS — Z12.31 ENCOUNTER FOR SCREENING MAMMOGRAM FOR MALIGNANT NEOPLASM OF BREAST: Primary | ICD-10-CM

## 2023-03-14 PROCEDURE — 3008F BODY MASS INDEX DOCD: CPT

## 2023-03-14 PROCEDURE — 3080F DIAST BP >= 90 MM HG: CPT

## 2023-03-14 PROCEDURE — 3077F SYST BP >= 140 MM HG: CPT

## 2023-03-14 PROCEDURE — 99213 OFFICE O/P EST LOW 20 MIN: CPT

## 2023-03-14 NOTE — TELEPHONE ENCOUNTER
Order placed. Myc message sent as last mammogram was 11/9/22 and informed pt not to complete this mammogram until after that date.

## 2023-03-14 NOTE — TELEPHONE ENCOUNTER
Pt would like her annual mammogram order placed.      Jaz Gomez     Future Appointments   Date Time Provider [place]   3/21/2023 12:30 PM KEE Longo [facility] [address]   3/25/2023 10:00 AM Dionicio Hansen  [facility]   11/10/2023 12:30 PM KEE Longo [facility] [address]

## 2023-03-17 DIAGNOSIS — I10 HYPERTENSION, UNSPECIFIED TYPE: ICD-10-CM

## 2023-03-17 NOTE — TELEPHONE ENCOUNTER
Per Robert Vega, Increase Metoprolol 25mg BID. Pt was for warned about the possible increase.   Pt notified  Med list updated

## 2023-03-17 NOTE — TELEPHONE ENCOUNTER
Per last [employer]-Ch[employer] message patient instructed to increase Metoprolol dosage to 1 tablet two times daily. Rx pended.

## 2023-03-21 ENCOUNTER — OFFICE VISIT (OUTPATIENT)
Dept: INTERNAL MEDICINE CLINIC | Facility: CLINIC | Age: 43
End: 2023-03-21
Payer: COMMERCIAL

## 2023-03-21 VITALS
BODY MASS INDEX: 41.45 KG/M2 | SYSTOLIC BLOOD PRESSURE: 138 MMHG | RESPIRATION RATE: 16 BRPM | HEART RATE: 78 BPM | DIASTOLIC BLOOD PRESSURE: 80 MMHG | TEMPERATURE: 98 F | OXYGEN SATURATION: 98 % | HEIGHT: 62.5 IN | WEIGHT: 231 LBS

## 2023-03-21 DIAGNOSIS — I15.2 HYPERTENSION DUE TO ENDOCRINE DISORDER: Primary | ICD-10-CM

## 2023-03-21 PROCEDURE — 3008F BODY MASS INDEX DOCD: CPT

## 2023-03-21 PROCEDURE — 3075F SYST BP GE 130 - 139MM HG: CPT

## 2023-03-21 PROCEDURE — 3079F DIAST BP 80-89 MM HG: CPT

## 2023-03-21 PROCEDURE — 99213 OFFICE O/P EST LOW 20 MIN: CPT

## 2023-03-25 ENCOUNTER — HOSPITAL ENCOUNTER (OUTPATIENT)
Dept: NUTRITION | Facility: HOSPITAL | Age: 43
Discharge: HOME | End: 2023-03-25
Payer: COMMERCIAL

## 2023-03-25 DIAGNOSIS — E05.90 HYPERTHYROIDISM: ICD-10-CM

## 2023-03-25 DIAGNOSIS — Z71.3 ENCOUNTER FOR WEIGHT LOSS COUNSELING: ICD-10-CM

## 2023-03-25 PROCEDURE — 97802 MEDICAL NUTRITION INDIV IN: CPT | Performed by: DIETITIAN, REGISTERED

## 2023-04-01 DIAGNOSIS — I15.2 HYPERTENSION DUE TO ENDOCRINE DISORDER: ICD-10-CM

## 2023-04-29 ENCOUNTER — HOSPITAL ENCOUNTER (OUTPATIENT)
Dept: NUTRITION | Facility: HOSPITAL | Age: 43
Discharge: HOME | End: 2023-04-29
Attending: INTERNAL MEDICINE
Payer: COMMERCIAL

## 2023-04-29 DIAGNOSIS — E05.90 HYPERTHYROIDISM: ICD-10-CM

## 2023-04-29 PROCEDURE — 97803 MED NUTRITION INDIV SUBSEQ: CPT | Performed by: DIETITIAN, REGISTERED

## 2023-06-06 ENCOUNTER — HOSPITAL ENCOUNTER (OUTPATIENT)
Dept: NUTRITION | Facility: HOSPITAL | Age: 43
Discharge: HOME | End: 2023-06-06
Payer: COMMERCIAL

## 2023-06-06 DIAGNOSIS — E05.90 HYPERTHYROIDISM: ICD-10-CM

## 2023-06-06 DIAGNOSIS — Z71.3 WEIGHT LOSS COUNSELING, ENCOUNTER FOR: ICD-10-CM

## 2023-06-06 PROCEDURE — 97803 MED NUTRITION INDIV SUBSEQ: CPT | Performed by: DIETITIAN, REGISTERED

## 2023-06-15 PROBLEM — I10 PRIMARY HYPERTENSION: Status: ACTIVE | Noted: 2023-06-15

## 2023-06-15 PROBLEM — I15.2 HYPERTENSION DUE TO ENDOCRINE DISORDER: Status: ACTIVE | Noted: 2023-06-15

## 2023-06-16 ENCOUNTER — OFFICE VISIT (OUTPATIENT)
Dept: INTERNAL MEDICINE CLINIC | Facility: CLINIC | Age: 43
End: 2023-06-16
Payer: COMMERCIAL

## 2023-06-16 VITALS
HEART RATE: 80 BPM | SYSTOLIC BLOOD PRESSURE: 128 MMHG | BODY MASS INDEX: 43.78 KG/M2 | WEIGHT: 244 LBS | HEIGHT: 62.5 IN | TEMPERATURE: 98 F | DIASTOLIC BLOOD PRESSURE: 78 MMHG | RESPIRATION RATE: 16 BRPM | OXYGEN SATURATION: 98 %

## 2023-06-16 DIAGNOSIS — M79.604 RIGHT LEG PAIN: ICD-10-CM

## 2023-06-16 DIAGNOSIS — E05.90 HYPERTHYROIDISM: ICD-10-CM

## 2023-06-16 DIAGNOSIS — I15.2 HYPERTENSION DUE TO ENDOCRINE DISORDER: Primary | ICD-10-CM

## 2023-06-16 PROCEDURE — 3008F BODY MASS INDEX DOCD: CPT

## 2023-06-16 PROCEDURE — 99214 OFFICE O/P EST MOD 30 MIN: CPT

## 2023-06-16 PROCEDURE — 3078F DIAST BP <80 MM HG: CPT

## 2023-06-16 PROCEDURE — 3074F SYST BP LT 130 MM HG: CPT

## 2023-06-20 ENCOUNTER — PATIENT MESSAGE (OUTPATIENT)
Dept: INTERNAL MEDICINE CLINIC | Facility: CLINIC | Age: 43
End: 2023-06-20

## 2023-06-20 DIAGNOSIS — I15.2 HYPERTENSION DUE TO ENDOCRINE DISORDER: ICD-10-CM

## 2023-06-21 NOTE — TELEPHONE ENCOUNTER
From: Jessica Degroot  To:  Francenia Closs, APRN  Sent: 6/20/2023 9:04 PM CDT  Subject: Prescription refill     Hello  metoprolol tartrate 25  For the above medication can I request a 90 day refill   Total 180 pills  To be taken 2 a day

## 2023-07-11 ENCOUNTER — OFFICE VISIT (OUTPATIENT)
Dept: INTERNAL MEDICINE CLINIC | Facility: CLINIC | Age: 43
End: 2023-07-11
Payer: COMMERCIAL

## 2023-07-11 VITALS
TEMPERATURE: 99 F | SYSTOLIC BLOOD PRESSURE: 150 MMHG | HEART RATE: 58 BPM | WEIGHT: 242 LBS | DIASTOLIC BLOOD PRESSURE: 95 MMHG | BODY MASS INDEX: 44 KG/M2 | OXYGEN SATURATION: 100 % | RESPIRATION RATE: 18 BRPM

## 2023-07-11 DIAGNOSIS — I15.2 HYPERTENSION DUE TO ENDOCRINE DISORDER: Primary | ICD-10-CM

## 2023-07-11 DIAGNOSIS — E03.2 HYPOTHYROIDISM DUE TO MEDICATION: ICD-10-CM

## 2023-07-11 PROCEDURE — 99214 OFFICE O/P EST MOD 30 MIN: CPT

## 2023-07-11 PROCEDURE — 3080F DIAST BP >= 90 MM HG: CPT

## 2023-07-11 PROCEDURE — 3077F SYST BP >= 140 MM HG: CPT

## 2023-07-13 ENCOUNTER — PATIENT MESSAGE (OUTPATIENT)
Dept: INTERNAL MEDICINE CLINIC | Facility: CLINIC | Age: 43
End: 2023-07-13

## 2023-07-14 NOTE — TELEPHONE ENCOUNTER
Routed to provider for review. Home BP added to self reported patient vitals. Per ALTHEA Altman:  Please let the patient know that the readings are much better then it was in the office. BP readings are at the borderline, but would like to hold off adding another agent right now. Update me in a week with a readings via [employer].

## 2023-07-29 ENCOUNTER — HOSPITAL ENCOUNTER (OUTPATIENT)
Dept: NUTRITION | Facility: HOSPITAL | Age: 43
Discharge: HOME | End: 2023-07-29
Attending: PHYSICIAN ASSISTANT
Payer: COMMERCIAL

## 2023-07-29 DIAGNOSIS — E66.01 CLASS 3 SEVERE OBESITY DUE TO EXCESS CALORIES WITH SERIOUS COMORBIDITY AND BODY MASS INDEX (BMI) OF 40.0 TO 44.9 IN ADULT (HCC): Primary | ICD-10-CM

## 2023-07-29 PROCEDURE — 97803 MED NUTRITION INDIV SUBSEQ: CPT | Performed by: DIETITIAN, REGISTERED

## 2023-07-29 NOTE — PROGRESS NOTES
Nutrition Assessment     Yoko Espinoza is a 38year old female. Referred by: Attending  Referring Physician Name: Jonathan Burton     Assessment      Medical Nutrition Therapy Comment: Weight loss counseling  Visit Information: Follow up Visit 6/6/23  30 minutes spent with patient     ANTHROPOMETRICS  HT: 5' 2.5\"  WT: Blind wt noted in paper document today (6/6/23), Declined weight today (4/29/23), 230.2 pounds (3/25/23)  BMI: 42.8  BMI CLASSIFICATION: greater than 40 kg/m2 - morbid obesity class III  Long term weight Goal: 150-160 pounds  Weight goal for the end of 2022: 200 pounds     SIGNIFICANT MEDICAL Hx  Significant Past Medical Hx: HTN, Hyperthyroidism, IBS  Pertinent Lab Results: 1/6/23: T4 1.9, TSH <0.01     DIET HISTORY  Number of meals per day: 3  Number of snacks per day: 2  Comment: (3/25/23): Patient meeting with dietitian to work on a healthful eating plan to promote weight loss. She was recently dx with hypertension and is being managed with Metoprolol. She was also found to have hyperthyroid and is managed with Methlmazole. Patient is trying to make better food choices to help promote weight loss. She has a busy lifestyle with a 2 hour commute daily for work. She is trying to make more foods at home and add more produce. She has IBS and has been following a Low FODMAP diet which has helped to manage her abdominal bloating. Discussed a lower carbohydrate, high protein , low FODMAP diet to promote weight loss. (4/29/23): Follow up for weight loss. Patient feels she has been making changes to diet to provide more protein, but focusing in carbohydrates portions has been too challenging at this time. She has a stressful job and long commute, so planning ahead is important, but she needs some easy ideas to help meet her goals. Added more activity, going to gym 2-3 days per week in the last month. Overall, she feels more aware of her food choices and is not gravitating to poor quality snacks. (6/6/23): Patient follow up for healthy eating plan. She feel more confident with her food choices. Not snacking regularly and not having cravings for junk foods. She is exercising more and making more meals at home. Overall, feels like she is making progress.   (7/29/23): Follow up for weight loss. Patient has been adjusting medication for her Grave's disease, She was hyperthyroid and is now hypothyroid, TSH increased from 0.69 on (5/8/23)  to 69.12 (7/8/23). Most recent lab was trending down to 47 (7/24/23). She is feeling better as far as energy with the changes in her TSH, she can now be more active. Her eating continues to improve with adding more protein to meals and feeling satiated. Some days she will crave more sweets, which coincides with increase in stress at work. Now, she is trying to be more aware of when she is hungry and needs to eat and when she is stress eating. PHYSICAL ACTIVITY  Type: Cardio at the gym  Frequency: 2-3 days per week  Physical Assessment: Recommend add 30-60 minutes of activiy daily     Nutrition Diagnosis: Obesity due to excess ntake as evidenced by BMI > 40     Intervention      Nutrition Education: Recommended Modification  Nutrition/Diet Handouts Given: Weight Loss Diet Handouts:  Weight Management Packet Includes: Calorie/Carb Counting, Portion Size Control, Food Label Reading and Food Journal        NUTRITION PRESCRIPTION:                   Needs:  1500 Calorie       gm Protein                   Oral Diet Prescription: Balanced CHO 1500 Calorie        Goals      Nutrition Goals:   1. Greek yogurt at night after dinner for a snack. 2. Focus on having a balanced meal(protein, grains or starch and a vegetable) and then deciding is a sweets is wanted. 3. Protein with each meal (1 handful)  4. Exercise, walking 5 days per week, cross train 1 day per week.    5. Follow up with RD 9/9/23 @ 9 am     Recommendation to MD: none at this time     Assessment of Ability/Barriers      Patient and/or Family Will: Verbalize Understanding     Patient and/or Family Ability to Learn: Retain Information     Readiness to Learn:  Motivated     Barriers to Learning: None           6/6/2023  Malia Up RD

## 2023-09-09 ENCOUNTER — HOSPITAL ENCOUNTER (OUTPATIENT)
Dept: NUTRITION | Facility: HOSPITAL | Age: 43
Discharge: HOME | End: 2023-09-09
Attending: PHYSICIAN ASSISTANT
Payer: COMMERCIAL

## 2023-09-09 DIAGNOSIS — E66.01 MORBID OBESITY (HCC): Primary | ICD-10-CM

## 2023-09-09 PROCEDURE — 97803 MED NUTRITION INDIV SUBSEQ: CPT | Performed by: DIETITIAN, REGISTERED

## 2023-09-09 NOTE — PROGRESS NOTES
Nutrition Assessment     Pamela Osler is a 38year old female. Referred by: Attending  Referring Physician Name: Josias Varun     Assessment      Medical Nutrition Therapy Comment: Weight loss counseling  Visit Information: Follow up Visit 9/9/23  30 minutes spent with patient     ANTHROPOMETRICS  HT: 5' 2.5\"  WT: 242 lb (7/11/23), Blind wt 238.9 (6/6/23), Declined weight today (4/29/23), 230.2 pounds (3/25/23)  BMI: 43.5  BMI CLASSIFICATION: greater than 40 kg/m2 - morbid obesity class III  Long term weight Goal: 150-160 pounds  Weight goal for the end of 2022: 200 pounds     SIGNIFICANT MEDICAL Hx  Significant Past Medical Hx: HTN, Hyperthyroidism, IBS  Pertinent Lab Results: 1/6/23: T4 1.9, TSH <0.01   9/7/23: TSH with reflex  72.42, Free T4 0.08  DIET HISTORY  Number of meals per day: 3  Number of snacks per day: 2  Comment: (3/25/23): Patient meeting with dietitian to work on a healthful eating plan to promote weight loss. She was recently dx with hypertension and is being managed with Metoprolol. She was also found to have hyperthyroid and is managed with Methlmazole. Patient is trying to make better food choices to help promote weight loss. She has a busy lifestyle with a 2 hour commute daily for work. She is trying to make more foods at home and add more produce. She has IBS and has been following a Low FODMAP diet which has helped to manage her abdominal bloating. Discussed a lower carbohydrate, high protein , low FODMAP diet to promote weight loss. (4/29/23): Follow up for weight loss. Patient feels she has been making changes to diet to provide more protein, but focusing in carbohydrates portions has been too challenging at this time. She has a stressful job and long commute, so planning ahead is important, but she needs some easy ideas to help meet her goals. Added more activity, going to gym 2-3 days per week in the last month.  Overall, she feels more aware of her food choices and is not gravitating to poor quality snacks.    (6/6/23): Patient follow up for healthy eating plan. She feel more confident with her food choices. Not snacking regularly and not having cravings for junk foods. She is exercising more and making more meals at home. Overall, feels like she is making progress.   (7/29/23): Follow up for weight loss. Patient has been adjusting medication for her Grave's disease, She was hyperthyroid and is now hypothyroid, TSH increased from 0.69 on (5/8/23)  to 69.12 (7/8/23). Most recent lab was trending down to 47 (7/24/23). She is feeling better as far as energy with the changes in her TSH, she can now be more active. Her eating continues to improve with adding more protein to meals and feeling satiated. Some days she will crave more sweets, which coincides with increase in stress at work. Now, she is trying to be more aware of when she is hungry and needs to eat and when she is stress eating.    (9/9/23): Follow up for weight loss meal plan. Patient had adjustment made to Levothyroxine medication, now at 75 mcg daily. She feels like her energy is improving. She is making better food choices, does crave sweets, but limits to a couple of times per week at work. She wants to work in this habit. She is hopeful she will see some change in her weight with an improvement in her hormone levels.       PHYSICAL ACTIVITY  Type: Cardio at the gym  Frequency: 2-3 days per week  Physical Assessment: Recommend add 30-60 minutes of activiy daily     Nutrition Diagnosis: Obesity due to excess ntake as evidenced by BMI > 40     Intervention      Nutrition Education: Recommended Modification  Nutrition/Diet Handouts Given: Weight Loss Diet Handouts:  Weight Management Packet Includes: Calorie/Carb Counting, Portion Size Control, Food Label Reading and Food Journal        NUTRITION PRESCRIPTION:                   Needs:  1500 Calorie       gm Protein                   Oral Diet Prescription: Balanced CHO 1500 Calorie        Goals      Nutrition Goals:   1. Track how often buying sweets during the work week (not eating at home currently). 2. Pack lunch and snacks for work. 3. Increase water intake to 80-90 ounces per day  4. Exercise, increase walking in prep for 5K  5. Follow up with RD 10/17/23 @ 11 am     Recommendation to MD: none at this time     Assessment of Ability/Barriers      Patient and/or Family Will: Verbalize Understanding     Patient and/or Family Ability to Learn: Retain Information     Readiness to Learn:  Motivated     Barriers to Learning: None           9/9/2023  Rebecca Castorena RD

## 2023-09-11 ENCOUNTER — TELEPHONE (OUTPATIENT)
Dept: INTERNAL MEDICINE CLINIC | Facility: CLINIC | Age: 43
End: 2023-09-11

## 2023-09-11 DIAGNOSIS — E66.01 MORBID OBESITY (HCC): Primary | ICD-10-CM

## 2023-09-15 DIAGNOSIS — I15.2 HYPERTENSION DUE TO ENDOCRINE DISORDER: ICD-10-CM

## 2023-09-24 ENCOUNTER — TELEPHONE (OUTPATIENT)
Dept: INTERNAL MEDICINE CLINIC | Facility: CLINIC | Age: 43
End: 2023-09-24

## 2023-09-24 DIAGNOSIS — K58.1 IRRITABLE BOWEL SYNDROME WITH CONSTIPATION: ICD-10-CM

## 2023-09-24 DIAGNOSIS — E66.01 MORBID OBESITY (HCC): Primary | ICD-10-CM

## 2023-09-26 ENCOUNTER — PATIENT MESSAGE (OUTPATIENT)
Dept: INTERNAL MEDICINE CLINIC | Facility: CLINIC | Age: 43
End: 2023-09-26

## 2023-09-26 NOTE — TELEPHONE ENCOUNTER
From: Lux Munoz  To:  Everette Mendez  Sent: 9/26/2023 10:07 AM CDT  Subject: Phone call    Hello  Can I have a team member call me

## 2023-10-16 ENCOUNTER — TELEPHONE (OUTPATIENT)
Dept: ENDOCRINOLOGY | Facility: HOSPITAL | Age: 43
End: 2023-10-16

## 2023-10-21 ENCOUNTER — HOSPITAL ENCOUNTER (OUTPATIENT)
Dept: NUTRITION | Facility: HOSPITAL | Age: 43
Discharge: HOME | End: 2023-10-21
Attending: PHYSICIAN ASSISTANT
Payer: COMMERCIAL

## 2023-10-21 PROCEDURE — 97803 MED NUTRITION INDIV SUBSEQ: CPT | Performed by: DIETITIAN, REGISTERED

## 2023-10-21 NOTE — PROGRESS NOTES
Nutrition Assessment     Martin Mae is a 38year old female. Referred by: Attending  Referring Physician Name: Anoop Parrish      Medical Nutrition Therapy Comment: Weight loss counseling, morbid Obesity  Visit Information: Follow up Visit 10/21/23  30 minutes spent with patient     ANTHROPOMETRICS  HT: 5' 2.5\"  WT: 239 lb (10/21/23), 242 (7/11/23), Blind wt 238.9 (6/6/23), Declined weight today (4/29/23), 230.2 pounds (3/25/23)  BMI: 43.5  BMI CLASSIFICATION: greater than 40 kg/m2 - morbid obesity class III  Long term weight Goal: 150-160 pounds  Weight goal for the end of 2022: 200 pounds     SIGNIFICANT MEDICAL Hx  Significant Past Medical Hx: HTN, Hyperthyroidism, IBS  Pertinent Lab Results: 1/6/23: T4 1.9, TSH <0.01   9/7/23: TSH with reflex  72.42, Free T4 0.08  10/19/23: TSH 0/14, T4 1.9  DIET HISTORY  Number of meals per day: 3  Number of snacks per day: 2  Comment: (3/25/23): Patient meeting with dietitian to work on a healthful eating plan to promote weight loss. She was recently dx with hypertension and is being managed with Metoprolol. She was also found to have hyperthyroid and is managed with Methlmazole. Patient is trying to make better food choices to help promote weight loss. She has a busy lifestyle with a 2 hour commute daily for work. She is trying to make more foods at home and add more produce. She has IBS and has been following a Low FODMAP diet which has helped to manage her abdominal bloating. Discussed a lower carbohydrate, high protein , low FODMAP diet to promote weight loss. (4/29/23): Follow up for weight loss. Patient feels she has been making changes to diet to provide more protein, but focusing in carbohydrates portions has been too challenging at this time. She has a stressful job and long commute, so planning ahead is important, but she needs some easy ideas to help meet her goals. Added more activity, going to gym 2-3 days per week in the last month. Overall, she feels more aware of her food choices and is not gravitating to poor quality snacks.    (6/6/23): Patient follow up for healthy eating plan. She feel more confident with her food choices. Not snacking regularly and not having cravings for junk foods. She is exercising more and making more meals at home. Overall, feels like she is making progress.   (7/29/23): Follow up for weight loss. Patient has been adjusting medication for her Grave's disease, She was hyperthyroid and is now hypothyroid, TSH increased from 0.69 on (5/8/23)  to 69.12 (7/8/23). Most recent lab was trending down to 47 (7/24/23). She is feeling better as far as energy with the changes in her TSH, she can now be more active. Her eating continues to improve with adding more protein to meals and feeling satiated. Some days she will crave more sweets, which coincides with increase in stress at work. Now, she is trying to be more aware of when she is hungry and needs to eat and when she is stress eating.    (9/9/23): Follow up for weight loss meal plan. Patient had adjustment made to Levothyroxine medication, now at 75 mcg daily. She feels like her energy is improving. She is making better food choices, does crave sweets, but limits to a couple of times per week at work. She wants to work in this habit. She is hopeful she will see some change in her weight with an improvement in her hormone levels. (10/21/23): Follow up for weight loss. Weight is up at 239 pounds today. Her TSH lab is now in the normal range with TSH 01.4, T4 1.9. Her medication is being reduced to 75 mcg which will likely be her dose going forward. Cara Pink is feeling more focused mentally, less fatigue and ready to now focus on diet and activity with her thyroid function greatly improved. She even notes that her BM have been more regular. Overall, she is feeling well.      PHYSICAL ACTIVITY  Type: Cardio at the gym  Frequency: 2-3 days per week  Physical Assessment: Recommend add 30-60 minutes of activiy daily     Nutrition Diagnosis: Obesity due to excess ntake as evidenced by BMI > 40     Intervention      Nutrition Education: Recommended Modification  Nutrition/Diet Handouts Given: Weight Loss Diet Handouts:  Weight Management Packet Includes: Calorie/Carb Counting, Portion Size Control, Food Label Reading and Food Journal        NUTRITION PRESCRIPTION:                   Needs:  1500 Calorie       gm Protein                   Oral Diet Prescription: Balanced CHO 1500 Calorie        Goals      Nutrition Goals:   1. Balanced meals and snacks. 2. Track food in notebook daily. 3. Increase water intake to 80-90 ounces per day  4. Exercise: 1-2 days at the gym, 2 days at home  5. Follow up with RD 12/2/23 @ 9 am     Recommendation to MD: none at this time     Assessment of Ability/Barriers      Patient and/or Family Will: Verbalize Understanding     Patient and/or Family Ability to Learn: Retain Information     Readiness to Learn:  Motivated     Barriers to Learning: None           10/21/2023  Efe Taveras RD

## 2023-11-10 ENCOUNTER — OFFICE VISIT (OUTPATIENT)
Dept: INTERNAL MEDICINE CLINIC | Facility: CLINIC | Age: 43
End: 2023-11-10
Payer: COMMERCIAL

## 2023-11-10 ENCOUNTER — HOSPITAL ENCOUNTER (OUTPATIENT)
Dept: MAMMOGRAPHY | Age: 43
Discharge: HOME | End: 2023-11-10
Attending: INTERNAL MEDICINE
Payer: COMMERCIAL

## 2023-11-10 VITALS
OXYGEN SATURATION: 99 % | BODY MASS INDEX: 40.98 KG/M2 | TEMPERATURE: 98 F | HEART RATE: 93 BPM | DIASTOLIC BLOOD PRESSURE: 80 MMHG | SYSTOLIC BLOOD PRESSURE: 155 MMHG | HEIGHT: 62.5 IN | RESPIRATION RATE: 18 BRPM | WEIGHT: 228.38 LBS

## 2023-11-10 DIAGNOSIS — I15.2 HYPERTENSION DUE TO ENDOCRINE DISORDER: ICD-10-CM

## 2023-11-10 DIAGNOSIS — Z00.00 ANNUAL PHYSICAL EXAM: Primary | ICD-10-CM

## 2023-11-10 DIAGNOSIS — E05.90 HYPERTHYROIDISM: ICD-10-CM

## 2023-11-10 DIAGNOSIS — Z12.31 ENCOUNTER FOR SCREENING MAMMOGRAM FOR MALIGNANT NEOPLASM OF BREAST: ICD-10-CM

## 2023-11-10 DIAGNOSIS — Z00.00 LABORATORY EXAM ORDERED AS PART OF ROUTINE GENERAL MEDICAL EXAMINATION: ICD-10-CM

## 2023-11-10 DIAGNOSIS — Z13.21 ENCOUNTER FOR VITAMIN DEFICIENCY SCREENING: ICD-10-CM

## 2023-11-10 PROCEDURE — 3079F DIAST BP 80-89 MM HG: CPT

## 2023-11-10 PROCEDURE — 77067 SCR MAMMO BI INCL CAD: CPT | Performed by: INTERNAL MEDICINE

## 2023-11-10 PROCEDURE — 3008F BODY MASS INDEX DOCD: CPT

## 2023-11-10 PROCEDURE — 77063 BREAST TOMOSYNTHESIS BI: CPT | Performed by: INTERNAL MEDICINE

## 2023-11-10 PROCEDURE — 3077F SYST BP >= 140 MM HG: CPT

## 2023-11-10 PROCEDURE — 99396 PREV VISIT EST AGE 40-64: CPT

## 2023-11-10 RX ORDER — AMLODIPINE BESYLATE 5 MG/1
5 TABLET ORAL DAILY
Qty: 30 TABLET | Refills: 0 | Status: SHIPPED | OUTPATIENT
Start: 2023-11-10 | End: 2023-12-10

## 2023-11-28 ENCOUNTER — PATIENT MESSAGE (OUTPATIENT)
Dept: INTERNAL MEDICINE CLINIC | Facility: CLINIC | Age: 43
End: 2023-11-28

## 2023-11-28 NOTE — TELEPHONE ENCOUNTER
From: Abbey Wiseman  To:  Gunjan Mcconnell  Sent: 11/28/2023 9:35 AM CST  Subject: Phone call    Lilliana Covarrubias can i have a nurse call me at [phone]

## 2023-11-28 NOTE — TELEPHONE ENCOUNTER
Contacted patient via phone. Patient currently at work and was sitting at desk at rest with a HR of 107-108. Now down to 98. Patient denies any cardiac symptoms. Per patient this HR is not typically normal for her. She also notified her endocrinologist who will be having thyroid labs completed on 12/1 as her TSH levels have not been WNL. Informed patient to continue to monitor HR. Patient verbalized understanding.

## 2023-12-02 ENCOUNTER — HOSPITAL ENCOUNTER (OUTPATIENT)
Dept: NUTRITION | Facility: HOSPITAL | Age: 43
Discharge: HOME | End: 2023-12-02
Attending: INTERNAL MEDICINE
Payer: COMMERCIAL

## 2023-12-02 DIAGNOSIS — E66.01 MORBID OBESITY (HCC): Primary | ICD-10-CM

## 2023-12-02 PROCEDURE — 97803 MED NUTRITION INDIV SUBSEQ: CPT | Performed by: DIETITIAN, REGISTERED

## 2023-12-02 NOTE — PROGRESS NOTES
Nutrition Assessment     Luciano Miller is a 41year old female. Referred by: Attending  Referring Physician Name: Avery Coyle     Assessment      Medical Nutrition Therapy Comment: Weight loss counseling, morbid Obesity (E66.01)  Visit Information: Follow up Visit 12/2/23  30 minutes spent with patient     ANTHROPOMETRICS  HT: 5' 2.5\"  WT: 228 lb (12/2/23), 239 lb (10/21/23), 242 (7/11/23), Blind wt 238.9 (6/6/23), Declined weight today (4/29/23), 230.2 pounds (3/25/23)  BMI: 41  BMI CLASSIFICATION: greater than 40 kg/m2 - morbid obesity class III  Long term weight Goal: 150-160 pounds  Weight goal for the end of 2023: 200 pounds     SIGNIFICANT MEDICAL Hx  Significant Past Medical Hx: HTN, Hyperthyroidism, IBS  Pertinent Lab Results: 1/6/23: T4 1.9, TSH <0.01   9/7/23: TSH with reflex  72.42, Free T4 0.08  10/19/23: TSH 0/14, T4 1.9  DIET HISTORY  Number of meals per day: 3  Number of snacks per day: 2  Comment: (3/25/23): Patient meeting with dietitian to work on a healthful eating plan to promote weight loss. She was recently dx with hypertension and is being managed with Metoprolol. She was also found to have hyperthyroid and is managed with Methlmazole. Patient is trying to make better food choices to help promote weight loss. She has a busy lifestyle with a 2 hour commute daily for work. She is trying to make more foods at home and add more produce. She has IBS and has been following a Low FODMAP diet which has helped to manage her abdominal bloating. Discussed a lower carbohydrate, high protein , low FODMAP diet to promote weight loss. (4/29/23): Follow up for weight loss. Patient feels she has been making changes to diet to provide more protein, but focusing in carbohydrates portions has been too challenging at this time. She has a stressful job and long commute, so planning ahead is important, but she needs some easy ideas to help meet her goals.  Added more activity, going to gym 2-3 days per week in the last month. Overall, she feels more aware of her food choices and is not gravitating to poor quality snacks.    (6/6/23): Patient follow up for healthy eating plan. She feel more confident with her food choices. Not snacking regularly and not having cravings for junk foods. She is exercising more and making more meals at home. Overall, feels like she is making progress.   (7/29/23): Follow up for weight loss. Patient has been adjusting medication for her Grave's disease, She was hyperthyroid and is now hypothyroid, TSH increased from 0.69 on (5/8/23)  to 69.12 (7/8/23). Most recent lab was trending down to 47 (7/24/23). She is feeling better as far as energy with the changes in her TSH, she can now be more active. Her eating continues to improve with adding more protein to meals and feeling satiated. Some days she will crave more sweets, which coincides with increase in stress at work. Now, she is trying to be more aware of when she is hungry and needs to eat and when she is stress eating.    (9/9/23): Follow up for weight loss meal plan. Patient had adjustment made to Levothyroxine medication, now at 75 mcg daily. She feels like her energy is improving. She is making better food choices, does crave sweets, but limits to a couple of times per week at work. She wants to work in this habit. She is hopeful she will see some change in her weight with an improvement in her hormone levels. (10/21/23): Follow up for weight loss. Weight is up at 239 pounds today. Her TSH lab is now in the normal range with TSH 01.4, T4 1.9. Her medication is being reduced to 75 mcg which will likely be her dose going forward. Brittany Dowell is feeling more focused mentally, less fatigue and ready to now focus on diet and activity with her thyroid function greatly improved. She even notes that her BM have been more regular. Overall, she is feeling well. (12/2/23): Follow up for weight loss.  Brittany Dowell has been working with her endocrinologist on managing her thyroid. She is now taking levothyroxine 75 mcg 6 days per week and 1 day off. Her TSH level is below normal and Free T4 is elevated, but improving. She will follow up with Zion this month. He weight is down 11 pounds in 6 weeks. He energy is better, less lethargy. She still feels tried after a long work day, but no longer sleepy. Will continue with current plan, decreasing sweets and recommend eating out less often. PHYSICAL ACTIVITY  Type: Cardio at the gym  Frequency: 2-3 days per week  Physical Assessment: Recommend add 30-60 minutes of activiy daily     Nutrition Diagnosis: Obesity due to excess ntake as evidenced by BMI > 40     Intervention      Nutrition Education: Recommended Modification  Nutrition/Diet Handouts Given: Weight Loss Diet Handouts:  Weight Management Packet Includes: Calorie/Carb Counting, Portion Size Control, Food Label Reading and Food Journal        NUTRITION PRESCRIPTION:                   Needs:  1500 Calorie       gm Protein                   Oral Diet Prescription: Balanced CHO 1500 Calorie        Goals      Nutrition Goals:   1. Practise mindfulness with eating. Add a note each day to log stating emotions of the day. 2. Track food in notebook daily. 3. Track water water intake daily in food log, goal 80-90 ounces per day  4. Exercise: 2 days per week  5. Cycle sweets. Take 2 weeks off all sweets and than allow 1 day with a sweet. Continue cycle. 6. Increase vegetables, try with adding more soups. 7. Follow up with RD 1/20/23 @ 10 am     Recommendation to MD: none at this time     Assessment of Ability/Barriers      Patient and/or Family Will: Verbalize Understanding     Patient and/or Family Ability to Learn: Retain Information     Readiness to Learn:  Motivated     Barriers to Learning: None           12/2/2023  Yuriy Savage RD

## 2023-12-11 DIAGNOSIS — I15.2 HYPERTENSION DUE TO ENDOCRINE DISORDER: ICD-10-CM

## 2023-12-11 RX ORDER — AMLODIPINE BESYLATE 5 MG/1
5 TABLET ORAL DAILY
Qty: 30 TABLET | Refills: 0 | OUTPATIENT
Start: 2023-12-11 | End: 2024-01-10

## 2023-12-14 DIAGNOSIS — I15.2 HYPERTENSION DUE TO ENDOCRINE DISORDER: ICD-10-CM

## 2023-12-14 RX ORDER — AMLODIPINE BESYLATE 5 MG/1
5 TABLET ORAL DAILY
Qty: 30 TABLET | Refills: 0 | Status: SHIPPED | OUTPATIENT
Start: 2023-12-14 | End: 2024-01-13

## 2023-12-14 NOTE — TELEPHONE ENCOUNTER
Last time medication was refilled 11/30/2023  Quantity and # of refills 30  w 0  Last OV 11/10/2023  Next OV 11/11/2024    Failed protocol.       Sent to [facility] for approval.

## 2023-12-14 NOTE — TELEPHONE ENCOUNTER
Last time medication was refilled 09/15/2023  Quantity and # of refills 180 w 0  Last OV 11/10/2023  Next OV 11/11/2024  Protocol failed     Sent to [facility] for approval.

## 2024-01-07 LAB
ABSOLUTE BASOPHILS: 22 CELLS/UL (ref 0–200)
ABSOLUTE EOSINOPHILS: 48 CELLS/UL (ref 15–500)
ABSOLUTE LYMPHOCYTES: 1500 CELLS/UL (ref 850–3900)
ABSOLUTE MONOCYTES: 343 CELLS/UL (ref 200–950)
ABSOLUTE NEUTROPHILS: 2486 CELLS/UL (ref 1500–7800)
ALBUMIN/GLOBULIN RATIO: 1.5 (CALC) (ref 1–2.5)
ALBUMIN: 3.7 G/DL (ref 3.6–5.1)
ALKALINE PHOSPHATASE: 66 U/L (ref 31–125)
ALT: 36 U/L (ref 6–29)
AST: 23 U/L (ref 10–30)
BASOPHILS: 0.5 %
BILIRUBIN, TOTAL: 0.4 MG/DL (ref 0.2–1.2)
BILIRUBIN: NEGATIVE
BUN: 10 MG/DL (ref 7–25)
CALCIUM: 9.4 MG/DL (ref 8.6–10.2)
CARBON DIOXIDE: 26 MMOL/L (ref 20–32)
CHLORIDE: 105 MMOL/L (ref 98–110)
CHOL/HDLC RATIO: 3.2 (CALC)
CHOLESTEROL, TOTAL: 167 MG/DL
CREATININE: 0.51 MG/DL (ref 0.5–0.99)
EGFR: 120 ML/MIN/1.73M2
EOSINOPHILS: 1.1 %
GLOBULIN: 2.5 G/DL (CALC) (ref 1.9–3.7)
GLUCOSE: 89 MG/DL (ref 65–99)
GLUCOSE: NEGATIVE
HDL CHOLESTEROL: 52 MG/DL
HEMATOCRIT: 42.5 % (ref 35–45)
HEMOGLOBIN: 13.6 G/DL (ref 11.7–15.5)
LDL-CHOLESTEROL: 100 MG/DL (CALC)
LEUKOCYTE ESTERASE: NEGATIVE
LYMPHOCYTES: 34.1 %
MCH: 25.4 PG (ref 27–33)
MCHC: 32 G/DL (ref 32–36)
MCV: 79.3 FL (ref 80–100)
MONOCYTES: 7.8 %
MPV: 9.5 FL (ref 7.5–12.5)
NEUTROPHILS: 56.5 %
NITRITE: NEGATIVE
NON-HDL CHOLESTEROL: 115 MG/DL (CALC)
OCCULT BLOOD: NEGATIVE
PLATELET COUNT: 287 THOUSAND/UL (ref 140–400)
POTASSIUM: 3.6 MMOL/L (ref 3.5–5.3)
PROTEIN, TOTAL: 6.2 G/DL (ref 6.1–8.1)
RDW: 14.3 % (ref 11–15)
RED BLOOD CELL COUNT: 5.36 MILLION/UL (ref 3.8–5.1)
SODIUM: 139 MMOL/L (ref 135–146)
SPECIFIC GRAVITY: 1.03 (ref 1–1.03)
TRIGLYCERIDES: 68 MG/DL
VITAMIN D, 25-OH, TOTAL: 51 NG/ML (ref 30–100)
WHITE BLOOD CELL COUNT: 4.4 THOUSAND/UL (ref 3.8–10.8)

## 2024-01-08 ENCOUNTER — TELEPHONE (OUTPATIENT)
Dept: INTERNAL MEDICINE CLINIC | Facility: CLINIC | Age: 44
End: 2024-01-08

## 2024-01-08 NOTE — TELEPHONE ENCOUNTER
Concerns about hyperthyroidism or BP  Has questions for clinical staff    From sat 138/86 pulse 87  133/89 pulse 84    Please call to discuss    Last OV 11/10/23

## 2024-01-08 NOTE — PROGRESS NOTES
Saba Roach is a 43 year old female.  HPI:   HPI   Pt presents today for HTN f/u. Home systolic BP in range of 130 and diastolic in 80's. Taking medication as prescribed.  Two weeks ago has started to experience tinnitus, but has been noticing increased HR in low 100's.   Currently pt is on Levothyroxine 25mcs daily that was decreased yesterday from 75mcs. Last TSH 0.01, T4 2.8. Will be rechecking labs in 6 wks. Follows with hanna. Per endo she has antibodies for hashimoto's and graves, but thinks hypothyroidism is more prominent. It has been difficult to manage the thyroid hormones, might require thyroidectomy in the future. Pt has experienced increased HR and tinnitus in the past when her thyroid hormones were not controlled.  Pt started to experience non productive cough yesterday. Denies SOB, fever, body aches.  Current Outpatient Medications   Medication Sig Dispense Refill    levothyroxine 25 MCG Oral Tab 1 tablet (25 mcg total) before breakfast.      fluticasone propionate 50 MCG/ACT Nasal Suspension 2 sprays by Each Nare route daily. 1 each 0    metoprolol tartrate 25 MG Oral Tab Take 1 tablet (25 mg total) by mouth 2 (two) times daily. 180 tablet 0    amLODIPine 5 MG Oral Tab Take 1 tablet (5 mg total) by mouth daily. 30 tablet 0    Wheat Dextrin (BENEFIBER OR) Take by mouth.      FOLIC ACID OR Take by mouth.      omega-3 fatty acids 1000 MG Oral Cap Take 1,000 mg by mouth daily.      Cholecalciferol (VITAMIN D) 125 MCG (5000 UT) Oral Cap       Probiotic Product (PRO-BIOTIC BLEND) Oral Cap       Cyanocobalamin (VITAMIN B 12 OR) Take by mouth daily.        Calcium Carbonate 1500 (600 Ca) MG Oral Tab Take 250 mg by mouth daily.      prenatal multivitamin plus DHA 27-0.8-228 MG Oral Cap Take 1 capsule by mouth daily.        Past Medical History:   Diagnosis Date    IBS (irritable bowel syndrome)     Obesity       Social History:  Social History     Socioeconomic History    Marital status:     Tobacco Use    Smoking status: Never    Smokeless tobacco: Never   Vaping Use    Vaping Use: Never used   Substance and Sexual Activity    Alcohol use: Not Currently     Alcohol/week: 0.0 standard drinks of alcohol    Drug use: Never    Sexual activity: Yes     Partners: Male        REVIEW OF SYSTEMS:   Review of Systems   Constitutional: Negative.    HENT:  Positive for congestion and tinnitus. Negative for ear pain, postnasal drip, rhinorrhea, sinus pressure, sinus pain and sore throat.    Respiratory:  Positive for cough. Negative for shortness of breath, wheezing and stridor.    Cardiovascular:  Positive for palpitations.   Endocrine: Negative.    Musculoskeletal: Negative.    Neurological: Negative.    Psychiatric/Behavioral: Negative.           EXAM:   /85   Pulse 104   Temp 98.1 °F (36.7 °C)   Resp 18   Wt 223 lb (101.2 kg)   LMP 12/27/2023 (Exact Date)   SpO2 100%   BMI 40.14 kg/m²   Physical Exam  Vitals and nursing note reviewed.   Constitutional:       Appearance: Normal appearance. She is normal weight.   HENT:      Right Ear: A middle ear effusion is present. Tympanic membrane is not injected or erythematous.      Left Ear: A middle ear effusion is present. Tympanic membrane is not injected or erythematous.   Cardiovascular:      Rate and Rhythm: Normal rate and regular rhythm.      Pulses: Normal pulses.      Heart sounds: Normal heart sounds.   Pulmonary:      Effort: Pulmonary effort is normal.      Breath sounds: Normal breath sounds.   Skin:     General: Skin is warm and dry.      Capillary Refill: Capillary refill takes less than 2 seconds.   Neurological:      General: No focal deficit present.      Mental Status: She is alert and oriented to person, place, and time. Mental status is at baseline.   Psychiatric:         Mood and Affect: Mood normal.         Behavior: Behavior normal.         Thought Content: Thought content normal.         Judgment: Judgment normal.           ASSESSMENT AND PLAN:   Diagnoses and all orders for this visit:    Hypertension due to endocrine disorder   -currently controlled. CPM  Hyperthyroidism  Hypothyroidism due to medication   -uncontrolled. Defer to endo  Tinnitus:   -monitor and see if it subsides when thyroid hormone get's controlled, like it did in the past  Fluid level behind tympanic membrane of both ears  -     fluticasone propionate 50 MCG/ACT Nasal Suspension; 2 sprays by Each Nare route daily.    Acute cough   -discussed to start Mucinex with water increase. Delsym at night. If symptoms worsen or persist to test for Covid.     Pt will wait to receive influenza vaccine once feels better.  Requested Prescriptions     Signed Prescriptions Disp Refills    fluticasone propionate 50 MCG/ACT Nasal Suspension 1 each 0     Si sprays by Each Nare route daily.         The patient indicates understanding of these issues and agrees to the plan.  The patient is asked to return if symptoms persist or worsen.

## 2024-01-09 ENCOUNTER — OFFICE VISIT (OUTPATIENT)
Dept: INTERNAL MEDICINE CLINIC | Facility: CLINIC | Age: 44
End: 2024-01-09
Payer: COMMERCIAL

## 2024-01-09 VITALS
WEIGHT: 223 LBS | SYSTOLIC BLOOD PRESSURE: 135 MMHG | OXYGEN SATURATION: 100 % | DIASTOLIC BLOOD PRESSURE: 85 MMHG | RESPIRATION RATE: 18 BRPM | TEMPERATURE: 98 F | BODY MASS INDEX: 40 KG/M2 | HEART RATE: 104 BPM

## 2024-01-09 DIAGNOSIS — I15.2 HYPERTENSION DUE TO ENDOCRINE DISORDER: Primary | ICD-10-CM

## 2024-01-09 DIAGNOSIS — R05.1 ACUTE COUGH: ICD-10-CM

## 2024-01-09 DIAGNOSIS — H65.93 FLUID LEVEL BEHIND TYMPANIC MEMBRANE OF BOTH EARS: ICD-10-CM

## 2024-01-09 DIAGNOSIS — E05.90 HYPERTHYROIDISM: ICD-10-CM

## 2024-01-09 DIAGNOSIS — H93.13 TINNITUS OF BOTH EARS: ICD-10-CM

## 2024-01-09 DIAGNOSIS — E03.2 HYPOTHYROIDISM DUE TO MEDICATION: ICD-10-CM

## 2024-01-09 PROCEDURE — 99214 OFFICE O/P EST MOD 30 MIN: CPT

## 2024-01-09 PROCEDURE — 3075F SYST BP GE 130 - 139MM HG: CPT

## 2024-01-09 PROCEDURE — 3079F DIAST BP 80-89 MM HG: CPT

## 2024-01-09 RX ORDER — FLUTICASONE PROPIONATE 50 MCG
2 SPRAY, SUSPENSION (ML) NASAL DAILY
Qty: 1 EACH | Refills: 0 | Status: SHIPPED | OUTPATIENT
Start: 2024-01-09 | End: 2025-01-03

## 2024-01-09 RX ORDER — LEVOTHYROXINE SODIUM 0.03 MG/1
25 TABLET ORAL
COMMUNITY
Start: 2024-01-09

## 2024-01-09 NOTE — PATIENT INSTRUCTIONS
For cough please take Mucinex with at least 60oz of water. Could take Delsym at night. Start taking Flonase 2 nasal sprays each nostril per day. Start taking daily antihistamine (Clartin, allegra).

## 2024-01-12 ENCOUNTER — TELEPHONE (OUTPATIENT)
Dept: INTERNAL MEDICINE CLINIC | Facility: CLINIC | Age: 44
End: 2024-01-12

## 2024-01-12 NOTE — TELEPHONE ENCOUNTER
Spoke with pt, had single episode of elevated heart reate of 120 when tried to get up from sitting position and noted her apple watch gave alert  Advised to monitor for now and if continues to happen more make an appt to discuss with Dennise OH  Pt is on metoprolol since April 2023  Also notified since she had Covid currently her body is may be adjusting to fight infection  Pt v/u

## 2024-01-12 NOTE — TELEPHONE ENCOUNTER
Wants to speak with nurse about heart rate with thyroid condition     Available after 12pm only

## 2024-01-18 ENCOUNTER — TELEPHONE (OUTPATIENT)
Dept: INTERNAL MEDICINE CLINIC | Facility: CLINIC | Age: 44
End: 2024-01-18

## 2024-01-18 DIAGNOSIS — I15.2 HYPERTENSION DUE TO ENDOCRINE DISORDER: ICD-10-CM

## 2024-01-18 DIAGNOSIS — R05.1 ACUTE COUGH: Primary | ICD-10-CM

## 2024-01-18 RX ORDER — ALBUTEROL SULFATE 90 UG/1
2 AEROSOL, METERED RESPIRATORY (INHALATION) EVERY 6 HOURS PRN
Qty: 6.7 G | Refills: 0 | Status: SHIPPED | OUTPATIENT
Start: 2024-01-18

## 2024-01-18 RX ORDER — BENZONATATE 200 MG/1
200 CAPSULE ORAL 3 TIMES DAILY PRN
Qty: 30 CAPSULE | Refills: 0 | Status: SHIPPED | OUTPATIENT
Start: 2024-01-18

## 2024-01-18 RX ORDER — AMLODIPINE BESYLATE 5 MG/1
5 TABLET ORAL DAILY
COMMUNITY
Start: 2024-01-18

## 2024-01-18 NOTE — TELEPHONE ENCOUNTER
Spoke with pt she was covid positive on 1/11/24.  She still continues to cough and have coughing fits. Yesterday she noticed a little bit of blood in her phlegm and then again today.  Ok to leave a detailed message on VM.      Per Yaima OH tessalon 200mg TID PRN, albuterol inhaler 2 puffs every 6 hours as needed for cough, if blood increases over the weekend paged provider and pt will be given prednisone and chest xray will be ordered.     Left detailed message regarding the above. RXs sent.

## 2024-02-02 DIAGNOSIS — I15.2 HYPERTENSION DUE TO ENDOCRINE DISORDER: ICD-10-CM

## 2024-02-02 RX ORDER — AMLODIPINE BESYLATE 5 MG/1
5 TABLET ORAL DAILY
Qty: 90 TABLET | Refills: 0 | Status: SHIPPED | OUTPATIENT
Start: 2024-02-02 | End: 2024-02-02

## 2024-02-02 RX ORDER — AMLODIPINE BESYLATE 5 MG/1
5 TABLET ORAL DAILY
Qty: 90 TABLET | Refills: 0 | Status: SHIPPED | OUTPATIENT
Start: 2024-02-02

## 2024-02-02 NOTE — TELEPHONE ENCOUNTER
Last time medication was refilled 12/23/2023  Quantity and # of refills 30 w 0  Last OV 01/09/2024  Next OV   Future Appointments   Date Time Provider Department Center   2/23/2024  8:00 AM Kendell Tiwari MD [facility]   2/24/2024 11:00 AM Juliann Dick, [facility]   11/11/2024  9:40 AM [facility] [address]   11/11/2024 11:00 AM Dennise Tony, [facility] [address] [facility] [address]       Passed protocol, Rx sent.

## 2024-02-23 ENCOUNTER — OFFICE VISIT (OUTPATIENT)
Facility: CLINIC | Age: 44
End: 2024-02-23

## 2024-02-23 ENCOUNTER — PATIENT MESSAGE (OUTPATIENT)
Facility: CLINIC | Age: 44
End: 2024-02-23

## 2024-02-23 VITALS
HEIGHT: 62 IN | DIASTOLIC BLOOD PRESSURE: 80 MMHG | OXYGEN SATURATION: 98 % | BODY MASS INDEX: 41.22 KG/M2 | SYSTOLIC BLOOD PRESSURE: 168 MMHG | HEART RATE: 102 BPM | WEIGHT: 224 LBS

## 2024-02-23 DIAGNOSIS — E05.90 HYPERTHYROIDISM: Primary | ICD-10-CM

## 2024-02-23 DIAGNOSIS — I10 PRIMARY HYPERTENSION: ICD-10-CM

## 2024-02-23 DIAGNOSIS — K58.1 IRRITABLE BOWEL SYNDROME WITH CONSTIPATION: ICD-10-CM

## 2024-02-23 DIAGNOSIS — E07.9 THYROID DISEASE: ICD-10-CM

## 2024-02-23 DIAGNOSIS — R74.01 HIGH LIVER TRANSAMINASE LEVEL: ICD-10-CM

## 2024-02-23 DIAGNOSIS — E05.80 HASHITOXICOSIS: ICD-10-CM

## 2024-02-23 DIAGNOSIS — E06.3 HASHITOXICOSIS: ICD-10-CM

## 2024-02-23 DIAGNOSIS — E05.00 GRAVES DISEASE: ICD-10-CM

## 2024-02-23 DIAGNOSIS — E66.01 CLASS 3 SEVERE OBESITY WITH BODY MASS INDEX (BMI) OF 40.0 TO 44.9 IN ADULT, UNSPECIFIED OBESITY TYPE, UNSPECIFIED WHETHER SERIOUS COMORBIDITY PRESENT (HCC): ICD-10-CM

## 2024-02-23 PROCEDURE — 99205 OFFICE O/P NEW HI 60 MIN: CPT | Performed by: INTERNAL MEDICINE

## 2024-02-23 NOTE — PATIENT INSTRUCTIONS
Labs today   F/u in 2-3 weeks to discuss result    Discussed with patient possible dx, treatment options, SERNA vs surgery vs meds. Discussed thyroid and pregnancy (if applicable), wt gain, eye disease, and SE of meds and SERNA. Methimazole may cause significant bone marrow depression; the most severe manifestation is agranulocytosis. May also cause Hepatotoxicity (including acute liver failure) Symptoms suggestive of hepatic dysfunction (eg, anorexia, pruritus, right upper quadrant pain) should prompt evaluation. If you have nausea, vomiting, abdominal pain, jaundice- yellow skin or eye color-, dark urine, light stool color, fever, sore throat or infection, call us or the PCP.  Remission rate of ~ 40% with use of antithyroid drugs for 1-1.5 years, their side effects, monitoring, and follow-up issues, specifically agranulocytosis, liver toxicity, anaphylaxis, rash, lupus like syndrome, metallic taste in the mouth, and joint aches. We discussed that patient should discontinue methimazole at the earliest sign of a fever, sore throat, or other infection, should call our office and have WBC count with differential done. The drug should be held until the result is available.     If applicable, Hyperthyroid pregnancy counseling. General counseling on contraception (Z30.09).  We discussed in details the adverse effect of uncontrolled hyperthyroidism on pregnancy    Also discussed the risk of Methimazole on the fetus.   Please notify us if you are pregnant or you are planning to get pregnant.   Print out was given to the Pt

## 2024-02-23 NOTE — PROGRESS NOTES
New Patient Evaluation - History and Physical    CONSULT - Reason for Visit:  Graves.  Requesting Physician:  Dr Dunbar  ..Houston Shnanon MD      CHIEF COMPLAINT:    Chief Complaint   Patient presents with    Consult     Thyroid   2nd opinion [facility]        HISTORY OF PRESENT ILLNESS:   Saba Roach is a 43 year old female who presents with Graves, uncontrolled thyroid levels     12/2022 was dx with hyperthyroid   2/2023 was feeling heat intolerance, palpitation, fatigue     Was on MMI 3-5/2023 and was tapering down.   Last dose was  5/22/2023 8/2024 started having hypothyroid symptoms.    Was started  LT4 25 mcg 7/2023, stopped 2/14/2024. Dose was increased to 50 and 75 mcg. Last dose was 25 mcg     No compressive sx   Had eye sx  before   Now feels normal   No change in BM   No period changes now but was heavy when was hypo  LMP 2/2024  Not on birth control       ASSESSMENT AND PLAN:  44 yo woman with Graves, HTN, obese, high LFTs,  Most likely she has hashitoxicosis  She had hypo thyroid recently (last 6 mo of 2023) was on LT4 and before that was on MMI (MMI 3-5/2023) for hyperthyroid      Outside w/u showed high TSI, TRAb, TPO   TSH fluctuates from very low suppressed to high 70s,   US thyroid 12/2022. No nodule, heterogeneous thyroid    She is not on meds now   Not on birth control   No GO   Clinically euthyroid    I discussed with pt in length today. She will think about her tx options and will f/u   Has a list of Qs . We discussed and answered her Qs    plan      Labs today   F/u in 2-3 weeks to discuss result  Discussed with patient possible dx, treatment options, SERNA vs surgery vs meds. Discussed thyroid and pregnancy (if applicable), wt gain, eye disease, and SE of meds and SERNA. Methimazole may cause significant bone marrow depression; the most severe manifestation is agranulocytosis. May also cause Hepatotoxicity (including acute liver failure) Symptoms suggestive of hepatic dysfunction (eg,  anorexia, pruritus, right upper quadrant pain) should prompt evaluation. If you have nausea, vomiting, abdominal pain, jaundice- yellow skin or eye color-, dark urine, light stool color, fever, sore throat or infection, call us or the PCP.  Remission rate of ~ 40% with use of antithyroid drugs for 1-1.5 years, their side effects, monitoring, and follow-up issues, specifically agranulocytosis, liver toxicity, anaphylaxis, rash, lupus like syndrome, metallic taste in the mouth, and joint aches. We discussed that patient should discontinue methimazole at the earliest sign of a fever, sore throat, or other infection, should call our office and have WBC count with differential done. The drug should be held until the result is available.   If applicable, Hyperthyroid pregnancy counseling. General counseling on contraception (Z30.09).  We discussed in details the adverse effect of uncontrolled hyperthyroidism on pregnancy    Also discussed the risk of Methimazole on the fetus.   Please notify us if you are pregnant or you are planning to get pregnant.   Print out was given to the Pt      PAST MEDICAL HISTORY:   Past Medical History:   Diagnosis Date    IBS (irritable bowel syndrome)     Obesity    Graves  HTN   Hyperthyroid  Obese     PAST SURGICAL HISTORY:   Past Surgical History:   Procedure Laterality Date    COLPOSCOPY,BX CERVIX/ENDOCERV CURR      HPV+, LSIL, subsequent paps have been normal        CURRENT MEDICATIONS:    No outpatient medications have been marked as taking for the 24 encounter (Office Visit) with Kendell Tiwari MD.     No meds   Probiotic   + biotin,     ALLERGIES:  Allergies   Allergen Reactions    Cephalexin      Unknown reaction Mother  so can not confirm reaction    Erythromycin      Unkown side effect Mother  can not confirm type of reaction    Novarlenekristal Dh      As child unkown reaction       SOCIAL HISTORY:    Social History     Socioeconomic History    Marital  status:    Tobacco Use    Smoking status: Never    Smokeless tobacco: Never   Vaping Use    Vaping Use: Never used   Substance and Sexual Activity    Alcohol use: Not Currently     Alcohol/week: 0.0 standard drinks of alcohol    Drug use: Never    Sexual activity: Yes     Partners: Male     No birth control     FAMILY HISTORY:   Family History   Problem Relation Age of Onset    Breast Cancer Mother 51    Heart Attack Father     Heart Attack Paternal Grandmother     Cancer Paternal Grandmother         thyroid    Heart Attack Paternal Grandfather    1st cousin on father side has thyroid disease      REVIEW OF SYSTEMS:  All negative other than HPI    PHYSICAL EXAM:   Height: 5' 2\" (157.5 cm) (02/23 0751)  Weight: 224 lb (101.6 kg) (02/23 0751)  BSA (Calculated - sq m): 2.01 sq meters (02/23 0751)  Pulse: 102 (02/23 0751)  BP: 168/80 (02/23 0751)  Temp: --  Do Not Use - Resp Rate: --  SpO2: 98 % (02/23 0751)    Body mass index is 40.97 kg/m².    Mild goiter BL  No tremors   Obese   No abd tenderness   No GO     CONSTITUTIONAL:  Awake and alert. Age appropriate, good hygiene not in acute distress. Well nourished and well developed. no acute distress   PSYCH:   Orientated to time, place, person & situation, Normal mood and affect, memory intact, normal insight and judgment, cooperative  Neuro: speech is clear. Awake, alert, no aphasia, no facial asymmetry, no nuchal rigidity  EYES:  No proptosis, no ptosis, conjunctiva normal  ENT:  Normocephalic, atraumatic  Eye: EOMI, normal lids, no discharge, no conjunctival erythema. No exophthalmos/proptosis, Ptosis negative   No rhinorrhea, moist oral mucosa  Neck: full range of motion  Neck/Thyroid: neck inspection: normal, No scar, mild goiter   LUNGS:  No acute respiratory distress, non-labored respiration. Speaking full sentences  CARDIOVASCULAR:  regular rate   ABDOMEN:  No abdominal pain.   SKIN:  no bruising or bleeding, no rashes and no lesions, Skin is dry, no  obvious rashes or lesions  EXTREMITIES: no gross abnormality   MSK: Moves extremities spontaneously. full range of motion in all major joints      DATA:     Pertinent data reviewed   US thyroid 12/2022  . No definable nodules in a heterogeneous appearing gland.      Latest Reference Range & Units 01/06/24 09:21   Neutrophils Absolute 1,500 - 7,800 cells/uL 2,486      Latest Reference Range & Units 01/06/24 09:21   AST (SGOT) 10 - 30 U/L 23   ALT (SGPT) 6 - 29 U/L 36 (H)   (H): Data is abnormally high   Latest Reference Range & Units 07/25/20 08:06 12/28/21 08:16 12/02/22 08:53 12/08/22 12:14 01/06/23 08:39 12/01/23 00:00   T4,Free (Direct) 0.8 - 1.8 ng/dL 0.9 1.1 1.5 1.6 1.9 (H)    TSH mIU/mL 5.29 (H) 5.68 (H) 0.05 (L)  <0.01 (L) 0.010 (E)   T3 TOTAL 76 - 181 ng/dL    152 198 (H)    T3 FREE 2.3 - 4.2 pg/mL    4.6 (H) 6.5 (H)    (H): Data is abnormally high  (L): Data is abnormally low  (E): External lab result           No results for input(s): \"TSH\", \"T4F\", \"T3F\", \"THYP\" in the last 72 hours.  No results found.        Orders Placed This Encounter   Procedures    THYROGLOBULIN ANTIBODY    Thyroid Peroxidase (TPO) AB    Thyroid Stimulating Immunoglobulin    TSH and Free T4    TSH RECEPTOR ANTIBODY (TBII)    Free T3 (Triiodothryronine)    HCG, Beta Subunit (Quant Pregnancy Test)    Comp Metabolic Panel (14)     Orders Placed This Encounter    THYROGLOBULIN ANTIBODY     Order Specific Question:   Release to patient     Answer:   Immediate    Thyroid Peroxidase (TPO) AB     Order Specific Question:   Release to patient     Answer:   Immediate    Thyroid Stimulating Immunoglobulin     Order Specific Question:   Release to patient     Answer:   Immediate    TSH and Free T4     Order Specific Question:   Release to patient     Answer:   Immediate    TSH RECEPTOR ANTIBODY (TBII)     Order Specific Question:   Release to patient     Answer:   Immediate    Free T3 (Triiodothryronine)     Order Specific Question:   Release to  patient     Answer:   Immediate    HCG, Beta Subunit (Quant Pregnancy Test)     Order Specific Question:   Release to patient     Answer:   Immediate    Comp Metabolic Panel (14)     Order Specific Question:   Release to patient     Answer:   Immediate          This is a specialized patient consultation in endocrinology and required comprehensive review of prior records, as well as current evaluation, with time required for consideration of complex endocrine issues and consultation. For this visit, I personally interviewed the patient, and family member if accompanied, performed the pertinent parts of the history and physical examination. ROS included screening for appropriate endocrine conditions.   Today's diagnosis and plan were reviewed in detail with the patient who states understanding and agrees with plan. I discussed with the patient possible diagnosis, differential diagnosis, need for work up , treatment options, alternatives and side effects.     Please see note for details about time spent which includes:   · pre-visit preparation  · reviewing records  · face to face time with the patient   · timely documentation of the encounter  · ordering medications/tests  · communication with care team  · care coordination    I appreciate the opportunity to be part of your patient's medical care and will keep you, as the referring and primary physicians, informed about the care of your patient, including possible future surgery and pathology findings. Please feel free to contact me should you have any questions.      Kendell Tiwari MD

## 2024-02-24 ENCOUNTER — HOSPITAL ENCOUNTER (OUTPATIENT)
Dept: NUTRITION | Facility: HOSPITAL | Age: 44
Discharge: HOME | End: 2024-02-24
Payer: COMMERCIAL

## 2024-02-24 DIAGNOSIS — E66.01 MORBID OBESITY (HCC): Primary | ICD-10-CM

## 2024-02-24 PROCEDURE — 97803 MED NUTRITION INDIV SUBSEQ: CPT | Performed by: DIETITIAN, REGISTERED

## 2024-02-24 NOTE — PROGRESS NOTES
Nutrition Assessment     Saba Roach is a 43 year old female.     Referred by: Attending  Referring Physician Name: Dennise Tony     Assessment      Medical Nutrition Therapy Comment: Weight loss counseling, morbid Obesity (E66.01)  Visit Information: Follow up Visit 2/24/24  45 minutes spent with patient     ANTHROPOMETRICS  HT: 5' 2.5\"  WT: 224 lb (2/23/24), 228 lb (12/2/23), 239 lb (10/21/23), 242 (7/11/23), Blind wt 238.9 (6/6/23), Declined weight today (4/29/23), 230.2 pounds (3/25/23)  BMI: 40.3  BMI CLASSIFICATION: greater than 40 kg/m2 - morbid obesity class III  Long term weight Goal: 150-160 pounds  Short term goal weight goal: 200 pounds     SIGNIFICANT MEDICAL Hx  Significant Past Medical Hx: HTN, Hyperthyroidism, IBS  Pertinent Lab Results: 1/6/23: T4 1.9, TSH <0.01   9/7/23: TSH with reflex  72.42, Free T4 0.08  10/19/23: TSH 0.14, T4 1.9  1/6/24: TSH with reflex 0.01, T4 2.8  DIET HISTORY  Number of meals per day: 3  Number of snacks per day: 2  Comment: (3/25/23): Patient meeting with dietitian to work on a healthful eating plan to promote weight loss. She was recently dx with hypertension and is being managed with Metoprolol. She was also found to have hyperthyroid and is managed with Methlmazole. Patient is trying to make better food choices to help promote weight loss. She has a busy lifestyle with a 2 hour commute daily for work. She is trying to make more foods at home and add more produce. She has IBS and has been following a Low FODMAP diet which has helped to manage her abdominal bloating. Discussed a lower carbohydrate, high protein , low FODMAP diet to promote weight loss.   (4/29/23): Follow up for weight loss. Patient feels she has been making changes to diet to provide more protein, but focusing in carbohydrates portions has been too challenging at this time. She has a stressful job and long commute, so planning ahead is important, but she needs some easy ideas to help meet her  goals. Added more activity, going to gym 2-3 days per week in the last month. Overall, she feels more aware of her food choices and is not gravitating to poor quality snacks.    (6/6/23): Patient follow up for healthy eating plan. She feel more confident with her food choices. Not snacking regularly and not having cravings for junk foods. She is exercising more and making more meals at home. Overall, feels like she is making progress.   (7/29/23): Follow up for weight loss. Patient has been adjusting medication for her Grave's disease, She was hyperthyroid and is now hypothyroid, TSH increased from 0.69 on (5/8/23)  to 69.12 (7/8/23). Most recent lab was trending down to 47 (7/24/23). She is feeling better as far as energy with the changes in her TSH, she can now be more active. Her eating continues to improve with adding more protein to meals and feeling satiated. Some days she will crave more sweets, which coincides with increase in stress at work. Now, she is trying to be more aware of when she is hungry and needs to eat and when she is stress eating.    (9/9/23): Follow up for weight loss meal plan. Patient had adjustment made to Levothyroxine medication, now at 75 mcg daily. She feels like her energy is improving. She is making better food choices, does crave sweets, but limits to a couple of times per week at work. She wants to work in this habit. She is hopeful she will see some change in her weight with an improvement in her hormone levels.   (10/21/23): Follow up for weight loss. Weight is up at 239 pounds today. Her TSH lab is now in the normal range with TSH 01.4, T4 1.9. Her medication is being reduced to 75 mcg which will likely be her dose going forward. Saba is feeling more focused mentally, less fatigue and ready to now focus on diet and activity with her thyroid function greatly improved. She even notes that her BM have been more regular. Overall, she is feeling well.  (12/2/23): Follow up for  weight loss. Saba has been working with her endocrinologist on managing her thyroid. She is now taking levothyroxine 75 mcg 6 days per week and 1 day off. Her TSH level is below normal and Free T4 is elevated, but improving. She will follow up with Endo this month. He weight is down 11 pounds in 6 weeks. He energy is better, less lethargy. She still feels tried after a long work day, but no longer sleepy. Will continue with current plan, decreasing sweets and recommend eating out less often.  (2/24/24): Follow up for weight loss. Saba returns stating she has been off track since beginning of January due to illness and feeling more tired. She started back to the gym in the past 2 week. Food was more quick options of order out, and more sweets. Portions were not out of control, which is likely the reason her weight is down 4 pounds in the past 2.5 months. She is motivated to make changes to start eating healthier again and continue with her weight loss.     PHYSICAL ACTIVITY  Type: Cardio at the gym  Frequency: 2-3 days per week  Physical Assessment: Recommend add 30-60 minutes of activiy daily     Nutrition Diagnosis: Obesity due to excess ntake as evidenced by BMI > 40     Intervention      Nutrition Education: Recommended Modification  Nutrition/Diet Handouts Given: Weight Loss Diet Handouts:  Weight Management Packet Includes: Calorie/Carb Counting, Portion Size Control, Food Label Reading and Food Journal        NUTRITION PRESCRIPTION:                   Needs:  1500 Calorie       gm Protein                   Oral Diet Prescription: Balanced CHO 1500 Calorie        Goals      Nutrition Goals:   1. Prep breakfast daily (oats with nuts and berries; eggs with toast & PB/hemp seeds; yogurt parfait with fruits and granola)  2. Prep lunch daily: grain bowls with vegetables and plant protein.  3. Increase water water intake daily to goal 80-90 ounces.  4. Exercise: 2-3 days per week  5. Stop sweets, remove  from the house.  6. Increase vegetables, try with adding more soups.  7. Follow up with RD 3/19/24 @ 11 am     Recommendation to MD: none at this time     Assessment of Ability/Barriers      Patient and/or Family Will: Verbalize Understanding     Patient and/or Family Ability to Learn: Retain Information     Readiness to Learn: Motivated     Barriers to Learning: None           2/24/2024  Juliann Dick RD

## 2024-03-01 ENCOUNTER — TELEPHONE (OUTPATIENT)
Dept: ENDOCRINOLOGY CLINIC | Facility: CLINIC | Age: 44
End: 2024-03-01

## 2024-03-01 NOTE — TELEPHONE ENCOUNTER
Called patient and let her know that lab orders were available and have been sent as a pdf through [facility]. Pt verbalized understanding and will call back if appropriate.

## 2024-03-01 NOTE — TELEPHONE ENCOUNTER
From: Saba Roach  Sent: 3/1/2024 7:31 AM CST  To: Katrin Donovan Clinical Staff  Subject: follow up    Good morning [facility] didn’t have the order in their system as I expected. Can you send me a paper copy of the order via my chart

## 2024-03-01 NOTE — TELEPHONE ENCOUNTER
Pt states that she went to [facility] and the lab order was not available.  She is requesting to speak to RN.  She needs to know if orders can be placed in her MyChart so that she can print them out.  Please call

## 2024-03-14 ENCOUNTER — APPOINTMENT (OUTPATIENT)
Dept: GENERAL RADIOLOGY | Age: 44
End: 2024-03-14
Attending: NURSE PRACTITIONER
Payer: COMMERCIAL

## 2024-03-14 ENCOUNTER — HOSPITAL ENCOUNTER (OUTPATIENT)
Age: 44
Discharge: HOME | End: 2024-03-14
Payer: COMMERCIAL

## 2024-03-14 VITALS
TEMPERATURE: 98 F | SYSTOLIC BLOOD PRESSURE: 160 MMHG | DIASTOLIC BLOOD PRESSURE: 81 MMHG | OXYGEN SATURATION: 100 % | RESPIRATION RATE: 20 BRPM | HEART RATE: 76 BPM

## 2024-03-14 DIAGNOSIS — M25.532 LEFT WRIST PAIN: Primary | ICD-10-CM

## 2024-03-14 LAB
ALBUMIN/GLOBULIN RATIO: 1.6 (CALC) (ref 1–2.5)
ALBUMIN: 3.9 G/DL (ref 3.6–5.1)
ALKALINE PHOSPHATASE: 68 U/L (ref 31–125)
ALT: 24 U/L (ref 6–29)
AST: 16 U/L (ref 10–30)
BILIRUBIN, TOTAL: 0.4 MG/DL (ref 0.2–1.2)
BUN/CREATININE RATIO: 20 (CALC) (ref 6–22)
BUN: 10 MG/DL (ref 7–25)
CALCIUM: 9.5 MG/DL (ref 8.6–10.2)
CARBON DIOXIDE: 24 MMOL/L (ref 20–32)
CHLORIDE: 106 MMOL/L (ref 98–110)
CREATININE: 0.49 MG/DL (ref 0.5–0.99)
EGFR: 121 ML/MIN/1.73M2
GLOBULIN: 2.4 G/DL (CALC) (ref 1.9–3.7)
GLUCOSE: 93 MG/DL (ref 65–139)
HCG, TOTAL, QN: <5 MIU/ML
POTASSIUM: 4 MMOL/L (ref 3.5–5.3)
PROTEIN, TOTAL: 6.3 G/DL (ref 6.1–8.1)
SODIUM: 139 MMOL/L (ref 135–146)
T3, FREE: 10 PG/ML (ref 2.3–4.2)
T4, FREE: 2.4 NG/DL (ref 0.8–1.8)
THYROGLOBULIN ANTIBODIES: <1 IU/ML
THYROID PEROXIDASE$ANTIBODIES: >900 IU/ML
TRAB: >40 IU/L
TSH: <0.01 MIU/L
TSI: 276 % BASELINE

## 2024-03-14 PROCEDURE — 73100 X-RAY EXAM OF WRIST: CPT | Performed by: NURSE PRACTITIONER

## 2024-03-14 PROCEDURE — 73110 X-RAY EXAM OF WRIST: CPT | Performed by: NURSE PRACTITIONER

## 2024-03-14 PROCEDURE — L3924 HFO WITHOUT JOINTS PRE OTS: HCPCS | Performed by: NURSE PRACTITIONER

## 2024-03-14 PROCEDURE — 99213 OFFICE O/P EST LOW 20 MIN: CPT | Performed by: NURSE PRACTITIONER

## 2024-03-14 NOTE — ED INITIAL ASSESSMENT (HPI)
Patient comes in states that her she has been experiecning some discomfort in the snuff box area of her L wrist. No recent injury or accident, when she applies pressure in the specific area she feels a shooting pain in the wrist area.

## 2024-03-15 NOTE — ED PROVIDER NOTES
Patient Seen in: [facility]      History     Chief Complaint   Patient presents with    Wrist Pain     Stated Complaint: Wrist Pain    Subjective:   HPI    43-year-old female presents to immediate care with left wrist pain.  Patient states she developed pain in the region of her the base of her left thumb x 3 days.  She denies injury.  She works typing.  There is no swelling or erythema.    Objective:   Past Medical History:   Diagnosis Date    IBS (irritable bowel syndrome)     Obesity               Past Surgical History:   Procedure Laterality Date    COLPOSCOPY,BX CERVIX/ENDOCERV CURR  2010    HPV+, LSIL, subsequent paps have been normal                 Social History     Socioeconomic History    Marital status:    Tobacco Use    Smoking status: Never     Passive exposure: Never    Smokeless tobacco: Never   Vaping Use    Vaping Use: Never used   Substance and Sexual Activity    Alcohol use: Not Currently     Alcohol/week: 0.0 standard drinks of alcohol    Drug use: Never    Sexual activity: Yes     Partners: Male              Review of Systems    Positive for stated complaint: Wrist Pain  Other systems are as noted in HPI.  Constitutional and vital signs reviewed.      All other systems reviewed and negative except as noted above.    Physical Exam     ED Triage Vitals [03/14/24 1809]   /81   Pulse 76   Resp 20   Temp 98.2 °F (36.8 °C)   Temp src Temporal   SpO2 100 %   O2 Device None (Room air)       Current:/81   Pulse 76   Temp 98.2 °F (36.8 °C) (Temporal)   Resp 20   LMP 03/10/2024 (Exact Date)   SpO2 100%         Physical Exam  Vitals reviewed.   Constitutional:       General: She is not in acute distress.  Cardiovascular:      Rate and Rhythm: Normal rate and regular rhythm.   Pulmonary:      Effort: Pulmonary effort is normal.      Breath sounds: Normal breath sounds.   Musculoskeletal:         General: Tenderness present. No swelling, deformity or signs of injury.    Skin:     General: Skin is warm and dry.      Findings: No bruising or erythema.   Neurological:      General: No focal deficit present.      Mental Status: She is alert and oriented to person, place, and time.   Psychiatric:         Mood and Affect: Mood normal.         Behavior: Behavior normal.               ED Course   Labs Reviewed - No data to display       XR WRIST COMPLETE (MIN 3 VIEWS), LEFT (CPT=73110)          PROCEDURE: XR WRIST COMPLETE (MIN 3 VIEWS), LEFT (CPT=73110)     COMPARISON: None.     INDICATIONS: Radial left wrist pain for 2 days. No known injury.     TECHNIQUE: 3 views were obtained.       FINDINGS:   BONES: On the oblique view there is a linear lucency extending through the midpole of the scaphoid which could represent bone cleft or nondisplaced fracture line.  This finding is otherwise not demonstrated on other planes.  Surrounding osseous   structures and joint spaces are otherwise grossly anatomic.  Mild degenerative change along the margin of the radiocarpal joint.  SOFT TISSUES: Soft tissue inflammation around the left wrist.  EFFUSION: None visible.   OTHER: Negative.               =====  CONCLUSION: Linear lucency extending through the midpole of the scaphoid only seen on the oblique projection possibly representing a nondisplaced fracture or bone cleft.  Given the provided clinical history, recommend additional imaging with ulnar   tilt/navicularis view of the left wrist.        Dictated by (CST): Epi Kan MD on 3/14/2024 at 6:45 PM       Finalized by (CST): pEi Kan MD on 3/14/2024 at 6:48 PM                 XR WRIST (1 VIEW), LEFT  (CPT=73100-52)          PROCEDURE: XR WRIST (1 VIEW), LEFT (CPT=73100-52)     COMPARISON: [facility], XR WRIST COMPLETE (MIN 3 VIEWS), LEFT (CPT=73110), 3/14/2024, 6:27 PM.     INDICATIONS: Radial left wrist pain for 2 days. No known recent injury.     TECHNIQUE: 1 PA view was obtained.        FINDINGS:   BONES: The scaphoid appears intact with no acute fracture.  Carpal arcs are well aligned.  Joint spaces are well maintained.  Mineralization is within normal limits.  SOFT TISSUES: No visible soft tissue swelling.   EFFUSION: None visible.   OTHER: Partially imaged metallic ring at the 4th digit.              =====  CONCLUSION:      No radiographically visible acute osseous injury of the left wrist.  Specifically, this dedicated scaphoid view demonstrates no acute scaphoid fracture.     Consider followup MR if there is suspicion for internal derangement.           Dictated by (CST): Devin Ngo MD on 3/14/2024 at 7:13 PM       Finalized by (CST): Devin Ngo MD on 3/14/2024 at 7:14 PM                 XR WRIST COMPLETE (MIN 3 VIEWS), LEFT (CPT=73110)          PROCEDURE: XR WRIST COMPLETE (MIN 3 VIEWS), LEFT (CPT=73110)     COMPARISON: None.     INDICATIONS: Radial left wrist pain for 2 days. No known injury.     TECHNIQUE: 3 views were obtained.       FINDINGS:   BONES: On the oblique view there is a linear lucency extending through the midpole of the scaphoid which could represent bone cleft or nondisplaced fracture line.  This finding is otherwise not demonstrated on other planes.  Surrounding osseous   structures and joint spaces are otherwise grossly anatomic.  Mild degenerative change along the margin of the radiocarpal joint.  SOFT TISSUES: Soft tissue inflammation around the left wrist.  EFFUSION: None visible.   OTHER: Negative.               =====  CONCLUSION: Linear lucency extending through the midpole of the scaphoid only seen on the oblique projection possibly representing a nondisplaced fracture or bone cleft.  Given the provided clinical history, recommend additional imaging with ulnar   tilt/navicularis view of the left wrist.        Dictated by (CST): Epi Kan MD on 3/14/2024 at 6:45 PM       Finalized by (CST): Epi Kan MD on 3/14/2024 at 6:48 PM                 XR  WRIST (1 VIEW), LEFT  (CPT=73100-52)          PROCEDURE: XR WRIST (1 VIEW), LEFT (CPT=73100-52)     COMPARISON: [facility], XR WRIST COMPLETE (MIN 3 VIEWS), LEFT (CPT=73110), 3/14/2024, 6:27 PM.     INDICATIONS: Radial left wrist pain for 2 days. No known recent injury.     TECHNIQUE: 1 PA view was obtained.       FINDINGS:   BONES: The scaphoid appears intact with no acute fracture.  Carpal arcs are well aligned.  Joint spaces are well maintained.  Mineralization is within normal limits.  SOFT TISSUES: No visible soft tissue swelling.   EFFUSION: None visible.   OTHER: Partially imaged metallic ring at the 4th digit.              =====  CONCLUSION:      No radiographically visible acute osseous injury of the left wrist.  Specifically, this dedicated scaphoid view demonstrates no acute scaphoid fracture.     Consider followup MR if there is suspicion for internal derangement.           Dictated by (CST): Devin Ngo MD on 3/14/2024 at 7:13 PM       Finalized by (CST): Devin Ngo MD on 3/14/2024 at 7:14 PM                               [facility]                                         Medical Decision Making  43-year-old female presents with left wrist pain.  Patient denies specific injury.  She has most of her pain in the navicular region of her left wrist.  Differential diagnosis includes carpal tunnel syndrome, tendinitis, wrist sprain, wrist fracture.  X-ray was done and does show a lucency extending through the scaphoid which could possibly be a fracture. Radiology has recommended additional views.  The additional isolated navicular view shows no acute fracture.  Patient was placed in a Velcro wrist splint with spica.  She was also given the name of an orthopedic specialist with some instructions to care for wrist sprain or strain she was recommended to follow-up if her condition worsens or there is no improvement.    Amount and/or Complexity of Data Reviewed  Radiology:  ordered.     Details: L wrist xray reviewed by IC provider.  Shows lucency extending through scaphoid which could represent a fracture.  Rec additional imaging with ulnar tilt/navicular view.  The isolated navicular view shows no acute fracture.      Risk  OTC drugs.        Disposition and Plan     Clinical Impression:  1. Left wrist pain         Disposition:  Discharge  3/14/2024  7:23 pm    Follow-up:  Houston Shannon MD  [address] [place] [id number] [phone]      If symptoms worsen    Phoenix Major MD  [address] [facility] [address]  [facility] [address]  [phone]    Schedule an appointment as soon as possible for a visit             Medications Prescribed:  Current Discharge Medication List

## 2024-03-15 NOTE — DISCHARGE INSTRUCTIONS
You are seen in immediate care today for left wrist pain.  You had an x-ray done with some additional views that showed no acute fracture.  Your wrist was placed in a Velcro wrist splint.  Please wear this and limit lifting or other activity with that wrist for 1 week.  You may ice and use acetaminophen.  You have been given the name of an orthopedic doctor if the pain persist or worsens.

## 2024-03-19 ENCOUNTER — TELEPHONE (OUTPATIENT)
Dept: ENDOCRINOLOGY CLINIC | Facility: CLINIC | Age: 44
End: 2024-03-19

## 2024-03-19 ENCOUNTER — HOSPITAL ENCOUNTER (OUTPATIENT)
Dept: NUTRITION | Facility: HOSPITAL | Age: 44
Discharge: HOME | End: 2024-03-19
Attending: INTERNAL MEDICINE
Payer: COMMERCIAL

## 2024-03-19 DIAGNOSIS — E66.01 MORBID OBESITY (HCC): Primary | ICD-10-CM

## 2024-03-19 PROCEDURE — 97803 MED NUTRITION INDIV SUBSEQ: CPT | Performed by: DIETITIAN, REGISTERED

## 2024-03-19 NOTE — PROGRESS NOTES
Nutrition Assessment     Saba Roach is a 43 year old female.     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 (6/6/23), Declined weight today (4/29/23), 230.2 pounds (3/25/23)  BMI: 40.3  BMI CLASSIFICATION: greater than 40 kg/m2 - morbid obesity class III  Long term weight Goal: 150-160 pounds  Short term goal weight goal: 200 pounds     SIGNIFICANT MEDICAL Hx  Significant Past Medical Hx: HTN, Hyperthyroidism, IBS  Pertinent Lab Results: 1/6/23: T4 1.9, TSH <0.01   9/7/23: TSH with reflex  72.42, Free T4 0.08  10/19/23: TSH 0.14, T4 1.9  1/6/24: TSH with reflex 0.01, T4 2.8  DIET HISTORY  Number of meals per day: 3  Number of snacks per day: 2  Comment: (3/25/23): Patient meeting with dietitian to work on a healthful eating plan to promote weight loss. She was recently dx with hypertension and is being managed with Metoprolol. She was also found to have hyperthyroid and is managed with Methlmazole. Patient is trying to make better food choices to help promote weight loss. She has a busy lifestyle with a 2 hour commute daily for work. She is trying to make more foods at home and add more produce. She has IBS and has been following a Low FODMAP diet which has helped to manage her abdominal bloating. Discussed a lower carbohydrate, high protein , low FODMAP diet to promote weight loss.   (4/29/23): Follow up for weight loss. Patient feels she has been making changes to diet to provide more protein, but focusing in carbohydrates portions has been too challenging at this time. She has a stressful job and long commute, so planning ahead is important, but she needs some easy ideas to  help meet her goals. Added more activity, going to gym 2-3 days per week in the last month. Overall, she feels more aware of her food choices and is not gravitating to poor quality snacks.    (6/6/23): Patient follow up for healthy eating plan. She feel more confident with her food choices. Not snacking regularly and not having cravings for junk foods. She is exercising more and making more meals at home. Overall, feels like she is making progress.   (7/29/23): Follow up for weight loss. Patient has been adjusting medication for her Grave's disease, She was hyperthyroid and is now hypothyroid, TSH increased from 0.69 on (5/8/23)  to 69.12 (7/8/23). Most recent lab was trending down to 47 (7/24/23). She is feeling better as far as energy with the changes in her TSH, she can now be more active. Her eating continues to improve with adding more protein to meals and feeling satiated. Some days she will crave more sweets, which coincides with increase in stress at work. Now, she is trying to be more aware of when she is hungry and needs to eat and when she is stress eating.    (9/9/23): Follow up for weight loss meal plan. Patient had adjustment made to Levothyroxine medication, now at 75 mcg daily. She feels like her energy is improving. She is making better food choices, does crave sweets, but limits to a couple of times per week at work. She wants to work in this habit. She is hopeful she will see some change in her weight with an improvement in her hormone levels.   (10/21/23): Follow up for weight loss. Weight is up at 239 pounds today. Her TSH lab is now in the normal range with TSH 01.4, T4 1.9. Her medication is being reduced to 75 mcg which will likely be her dose going forward. Saba is feeling more focused mentally, less fatigue and ready to now focus on diet and activity with her thyroid function greatly improved. She even notes that her BM have been more regular. Overall, she is feeling well.  (12/2/23):  Follow up for weight loss. Saba has been working with her endocrinologist on managing her thyroid. She is now taking levothyroxine 75 mcg 6 days per week and 1 day off. Her TSH level is below normal and Free T4 is elevated, but improving. She will follow up with Endo this month. He weight is down 11 pounds in 6 weeks. He energy is better, less lethargy. She still feels tried after a long work day, but no longer sleepy. Will continue with current plan, decreasing sweets and recommend eating out less often.  (2/24/24): Follow up for weight loss. Saba returns stating she has been off track since beginning of January due to illness and feeling more tired. She started back to the gym in the past 2 week. Food was more quick options of order out, and more sweets. Portions were not out of control, which is likely the reason her weight is down 4 pounds in the past 2.5 months. She is motivated to make changes to start eating healthier again and continue with her weight loss.  (3/19/24): Follow up for weight loss plan. Saba is feeling well, thyroid levels are close to the normal range. She will have more labs drown in 2 weeks to determine if her thyroid labs are improving and she can stay off the levothyroxine. She is wanting to try the Whole 30 meal plan, but feel emre there is not a good time to avoid specific food for 30 days. Discussed doing a shorter elimination period window and assess how she feels.      PHYSICAL ACTIVITY  Type: Cardio at the gym  Frequency: 2-3 days per week  Physical Assessment: Recommend add 30-60 minutes of activiy daily     Nutrition Diagnosis: Obesity due to excess ntake as evidenced by BMI > 40     Intervention      Nutrition Education: Recommended Modification  Nutrition/Diet Handouts Given: Weight Loss Diet Handouts:  Weight Management Packet Includes: Calorie/Carb Counting, Portion Size Control, Food Label Reading and Food Journal        NUTRITION PRESCRIPTION:                    Needs:  1500 Calorie       gm Protein                   Oral Diet Prescription: Balanced CHO 1500 Calorie        Goals      Nutrition Goals:   1. Follow Elimination diet for 14 days, 4/21- 5/4  2. During elimination  avoid dairy, gluten, sugar and beans/legumes.  3. Increase water water intake daily to goal 80-90 ounces.  4. Exercise: 2 days per week  5. Starting 5/5, introduce 1 eliminated food over a 48 hour period and note any physical or emotional responses on a food log.  6. Follow up with RD 5/7/24 @ 4 pm     Recommendation to MD: none at this time     Assessment of Ability/Barriers      Patient and/or Family Will: Verbalize Understanding     Patient and/or Family Ability to Learn: Retain Information     Readiness to Learn: Motivated     Barriers to Learning: None           3/19/2024  Juliann Dick RD

## 2024-03-20 NOTE — TELEPHONE ENCOUNTER
Called pt, ;pt seeing other endo. Pt states she will give us a call back and let us know if she would like to continue to fu with our office.

## 2024-03-27 DIAGNOSIS — I15.2 HYPERTENSION DUE TO ENDOCRINE DISORDER: ICD-10-CM

## 2024-04-20 DIAGNOSIS — I15.2 HYPERTENSION DUE TO ENDOCRINE DISORDER: ICD-10-CM

## 2024-04-21 RX ORDER — AMLODIPINE BESYLATE 5 MG/1
5 TABLET ORAL DAILY
Qty: 90 TABLET | Refills: 0 | Status: SHIPPED | OUTPATIENT
Start: 2024-04-21

## 2024-05-07 ENCOUNTER — HOSPITAL ENCOUNTER (OUTPATIENT)
Dept: NUTRITION | Facility: HOSPITAL | Age: 44
Discharge: HOME | End: 2024-05-07
Attending: INTERNAL MEDICINE
Payer: COMMERCIAL

## 2024-05-07 DIAGNOSIS — E66.01 MORBID OBESITY (HCC): Primary | ICD-10-CM

## 2024-05-07 PROCEDURE — 97803 MED NUTRITION INDIV SUBSEQ: CPT | Performed by: DIETITIAN, REGISTERED

## 2024-05-07 NOTE — PROGRESS NOTES
Nutrition Assessment     Saba Roach is a 43 year old female.     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 (6/6/23), Declined weight today (4/29/23), 230.2 pounds (3/25/23)  BMI: 40.3  BMI CLASSIFICATION: greater than 40 kg/m2 - morbid obesity class III  Long term weight Goal: 150-160 pounds  Short term goal weight goal: 200 pounds     SIGNIFICANT MEDICAL Hx  Significant Past Medical Hx: HTN, Hyperthyroidism, IBS  Pertinent Lab Results: 1/6/23: T4 1.9, TSH <0.01   9/7/23: TSH with reflex  72.42, Free T4 0.08  10/19/23: TSH 0.14, T4 1.9  1/6/24: TSH with reflex 0.01, T4 2.8  3/14.24: TSH 0.01, Free T4 2.2  DIET HISTORY  Number of meals per day: 3  Number of snacks per day: 2  Comment: (3/25/23): Patient meeting with dietitian to work on a healthful eating plan to promote weight loss. She was recently dx with hypertension and is being managed with Metoprolol. She was also found to have hyperthyroid and is managed with Methlmazole. Patient is trying to make better food choices to help promote weight loss. She has a busy lifestyle with a 2 hour commute daily for work. She is trying to make more foods at home and add more produce. She has IBS and has been following a Low FODMAP diet which has helped to manage her abdominal bloating. Discussed a lower carbohydrate, high protein , low FODMAP diet to promote weight loss.   (4/29/23): Follow up for weight loss. Patient feels she has been making changes to diet to provide more protein, but focusing in carbohydrates portions has been too challenging at this time. She has a stressful job and long commute, so planning ahead is important,  but she needs some easy ideas to help meet her goals. Added more activity, going to gym 2-3 days per week in the last month. Overall, she feels more aware of her food choices and is not gravitating to poor quality snacks.    (6/6/23): Patient follow up for healthy eating plan. She feel more confident with her food choices. Not snacking regularly and not having cravings for junk foods. She is exercising more and making more meals at home. Overall, feels like she is making progress.   (7/29/23): Follow up for weight loss. Patient has been adjusting medication for her Grave's disease, She was hyperthyroid and is now hypothyroid, TSH increased from 0.69 on (5/8/23)  to 69.12 (7/8/23). Most recent lab was trending down to 47 (7/24/23). She is feeling better as far as energy with the changes in her TSH, she can now be more active. Her eating continues to improve with adding more protein to meals and feeling satiated. Some days she will crave more sweets, which coincides with increase in stress at work. Now, she is trying to be more aware of when she is hungry and needs to eat and when she is stress eating.    (9/9/23): Follow up for weight loss meal plan. Patient had adjustment made to Levothyroxine medication, now at 75 mcg daily. She feels like her energy is improving. She is making better food choices, does crave sweets, but limits to a couple of times per week at work. She wants to work in this habit. She is hopeful she will see some change in her weight with an improvement in her hormone levels.   (10/21/23): Follow up for weight loss. Weight is up at 239 pounds today. Her TSH lab is now in the normal range with TSH 01.4, T4 1.9. Her medication is being reduced to 75 mcg which will likely be her dose going forward. Saba is feeling more focused mentally, less fatigue and ready to now focus on diet and activity with her thyroid function greatly improved. She even notes that her BM have been more regular. Overall,  she is feeling well.  (12/2/23): Follow up for weight loss. Saba has been working with her endocrinologist on managing her thyroid. She is now taking levothyroxine 75 mcg 6 days per week and 1 day off. Her TSH level is below normal and Free T4 is elevated, but improving. She will follow up with Endo this month. He weight is down 11 pounds in 6 weeks. He energy is better, less lethargy. She still feels tried after a long work day, but no longer sleepy. Will continue with current plan, decreasing sweets and recommend eating out less often.  (2/24/24): Follow up for weight loss. Saba returns stating she has been off track since beginning of January due to illness and feeling more tired. She started back to the gym in the past 2 week. Food was more quick options of order out, and more sweets. Portions were not out of control, which is likely the reason her weight is down 4 pounds in the past 2.5 months. She is motivated to make changes to start eating healthier again and continue with her weight loss.  (3/19/24): Follow up for weight loss plan. Saba is feeling well, thyroid levels are close to the normal range. She will have more labs drown in 2 weeks to determine if her thyroid labs are improving and she can stay off the levothyroxine. She is wanting to try the Whole 30 meal plan, but feel emre there is not a good time to avoid specific food for 30 days. Discussed doing a shorter elimination period window and assess how she feels.  (5/7/24): Follow up for weight loss. Saba is feeling well, energy continues to be good most days. She is now exercising more regularly. Her goal is anything from 5 minutes to 60 minutes, as long as she gets out moving. Most activity is walking  and there are 2 days of strength training at the gym. She declined being weighed to day, but will have it checked at next follow up in June.      PHYSICAL ACTIVITY  Type: Cardio or strength  Frequency:5-7 days per week  Physical  Assessment: Recommend add 30-60 minutes of activiy daily     Nutrition Diagnosis: Obesity due to excess ntake as evidenced by BMI > 40     Intervention      Nutrition Education: Recommended Modification  Nutrition/Diet Handouts Given: Weight Loss Diet Handouts:  Weight Management Packet Includes: Calorie/Carb Counting, Portion Size Control, Food Label Reading and Food Journal        NUTRITION PRESCRIPTION:                   Needs:  1500 Calorie       gm Protein                   Oral Diet Prescription: Balanced CHO 1500 Calorie        Goals      Nutrition Goals:   1. Increase vegetables at lunch and dinner.   2. Continue to limit soy and dairy  3. Increase water water intake daily to goal 80-90 ounces.  4. Exercise: 2 days per week at gym, other days walking at home  5. Log food daily. Limit carbohydrates to less than 150 grams per day. Have a protein and fruit or veggie with each starch.  6. Follow up with RD 6/11/24 @  11 am     Recommendation to MD: none at this time     Assessment of Ability/Barriers      Patient and/or Family Will: Verbalize Understanding     Patient and/or Family Ability to Learn: Retain Information     Readiness to Learn: Motivated     Barriers to Learning: None           5/7/2024  Juliann Dick RD

## 2024-06-11 ENCOUNTER — HOSPITAL ENCOUNTER (OUTPATIENT)
Dept: NUTRITION | Facility: HOSPITAL | Age: 44
Discharge: HOME | End: 2024-06-11
Attending: INTERNAL MEDICINE
Payer: COMMERCIAL

## 2024-06-11 DIAGNOSIS — E66.01 MORBID OBESITY (HCC): Primary | ICD-10-CM

## 2024-06-11 PROCEDURE — 97803 MED NUTRITION INDIV SUBSEQ: CPT | Performed by: DIETITIAN, REGISTERED

## 2024-06-11 NOTE — PROGRESS NOTES
Nutrition Assessment     Saba Roach is a 43 year old female.     Referred by: Attending  Referring Physician Name: Dennise Parrish      Medical Nutrition Therapy Comment: Weight loss counseling, morbid Obesity (E66.01)  Visit Information: Follow up Visit 6/11/24  30 minutes spent with patient     ANTHROPOMETRICS  HT: 5' 2.5\"  WT: No wt done today (6/11/24), 223 lb (3/18/24), 224 lb (2/23/24), 228 lb (12/2/23), 239 lb (10/21/23), 242 (7/11/23), Blind wt 238.9 (6/6/23), Declined weight today (4/29/23), 230.2 pounds (3/25/23)  BMI: 40.3  BMI CLASSIFICATION: greater than 40 kg/m2 - morbid obesity class III  Long term weight Goal: 150-160 pounds  Short term goal weight goal: 200 pounds     SIGNIFICANT MEDICAL Hx  Significant Past Medical Hx: HTN, Hyperthyroidism, IBS  Pertinent Lab Results: 1/6/23: T4 1.9, TSH <0.01   9/7/23: TSH with reflex  72.42, Free T4 0.08  10/19/23: TSH 0.14, T4 1.9  1/6/24: TSH with reflex 0.01, T4 2.8  3/14.24: TSH 0.01, Free T4 2.2  DIET HISTORY  Number of meals per day: 3  Number of snacks per day: 2  Comment: (3/25/23): Patient meeting with dietitian to work on a healthful eating plan to promote weight loss. She was recently dx with hypertension and is being managed with Metoprolol. She was also found to have hyperthyroid and is managed with Methlmazole. Patient is trying to make better food choices to help promote weight loss. She has a busy lifestyle with a 2 hour commute daily for work. She is trying to make more foods at home and add more produce. She has IBS and has been following a Low FODMAP diet which has helped to manage her abdominal bloating. Discussed a lower carbohydrate, high protein , low FODMAP diet to promote weight loss.   (4/29/23): Follow up for weight loss. Patient feels she has been making changes to diet to provide more protein, but focusing in carbohydrates portions has been too challenging at this time. She has a stressful job and long commute, so  planning ahead is important, but she needs some easy ideas to help meet her goals. Added more activity, going to gym 2-3 days per week in the last month. Overall, she feels more aware of her food choices and is not gravitating to poor quality snacks.    (6/6/23): Patient follow up for healthy eating plan. She feel more confident with her food choices. Not snacking regularly and not having cravings for junk foods. She is exercising more and making more meals at home. Overall, feels like she is making progress.   (7/29/23): Follow up for weight loss. Patient has been adjusting medication for her Grave's disease, She was hyperthyroid and is now hypothyroid, TSH increased from 0.69 on (5/8/23)  to 69.12 (7/8/23). Most recent lab was trending down to 47 (7/24/23). She is feeling better as far as energy with the changes in her TSH, she can now be more active. Her eating continues to improve with adding more protein to meals and feeling satiated. Some days she will crave more sweets, which coincides with increase in stress at work. Now, she is trying to be more aware of when she is hungry and needs to eat and when she is stress eating.    (9/9/23): Follow up for weight loss meal plan. Patient had adjustment made to Levothyroxine medication, now at 75 mcg daily. She feels like her energy is improving. She is making better food choices, does crave sweets, but limits to a couple of times per week at work. She wants to work in this habit. She is hopeful she will see some change in her weight with an improvement in her hormone levels.   (10/21/23): Follow up for weight loss. Weight is up at 239 pounds today. Her TSH lab is now in the normal range with TSH 01.4, T4 1.9. Her medication is being reduced to 75 mcg which will likely be her dose going forward. Saba is feeling more focused mentally, less fatigue and ready to now focus on diet and activity with her thyroid function greatly improved. She even notes that her BM have  been more regular. Overall, she is feeling well.  (12/2/23): Follow up for weight loss. Saba has been working with her endocrinologist on managing her thyroid. She is now taking levothyroxine 75 mcg 6 days per week and 1 day off. Her TSH level is below normal and Free T4 is elevated, but improving. She will follow up with Endo this month. He weight is down 11 pounds in 6 weeks. He energy is better, less lethargy. She still feels tried after a long work day, but no longer sleepy. Will continue with current plan, decreasing sweets and recommend eating out less often.  (2/24/24): Follow up for weight loss. Saba returns stating she has been off track since beginning of January due to illness and feeling more tired. She started back to the gym in the past 2 week. Food was more quick options of order out, and more sweets. Portions were not out of control, which is likely the reason her weight is down 4 pounds in the past 2.5 months. She is motivated to make changes to start eating healthier again and continue with her weight loss.  (3/19/24): Follow up for weight loss plan. Saba is feeling well, thyroid levels are close to the normal range. She will have more labs drown in 2 weeks to determine if her thyroid labs are improving and she can stay off the levothyroxine. She is wanting to try the Whole 30 meal plan, but feel emre there is not a good time to avoid specific food for 30 days. Discussed doing a shorter elimination period window and assess how she feels.  (5/7/24): Follow up for weight loss. Saba is feeling well, energy continues to be good most days. She is now exercising more regularly. Her goal is anything from 5 minutes to 60 minutes, as long as she gets out moving. Most activity is walking  and there are 2 days of strength training at the gym. She declined being weighed to day, but will have it checked at next follow up in June.   (6/11/24): Follow up for weight loss. Saba is feeling well.  She is now tracking her food in the [employer] zaki, average intake 2795-4639 calories. She notes that she is eating more sweets either near her menstrual cycle or when she is stressed at work. Discussed taking notes and seeing the motivation behind her eating to help make changes.      PHYSICAL ACTIVITY  Type: Cardio or strength  Frequency 3-5 days per week  Physical Assessment: Recommend add 30-60 minutes of activiy daily     Nutrition Diagnosis: Obesity due to excess ntake as evidenced by BMI > 40     Intervention      Nutrition Education: Recommended Modification  Nutrition/Diet Handouts Given: Weight Loss Diet Handouts:  Weight Management Packet Includes: Calorie/Carb Counting, Portion Size Control, Food Label Reading and Food Journal        NUTRITION PRESCRIPTION:                   Needs:  1500 Calorie       gm Protein                   Oral Diet Prescription: Balanced CHO 1500 Calorie        Goals      Nutrition Goals:   1. Pack a healthy afternoon snack to limit buying a snack near the office.  2. Continue to limit soy and dairy  3. Focus on food order: eat protein and vegetables first and then fruit or starches.  4. Exercise: 2 days per week at gym, other days walking at home  5. Log food daily in [employer] zaki.Have a protein and fruit or veggie with each starch.Track notes about how you are feeling: hungry, stressed, etc.  6. Follow up with RD 7/23/24 @  10 am     Recommendation to MD: none at this time     Assessment of Ability/Barriers      Patient and/or Family Will: Verbalize Understanding     Patient and/or Family Ability to Learn: Retain Information     Readiness to Learn: Motivated     Barriers to Learning: None           6/11/2024  Juliann Dick RD

## 2024-06-18 ENCOUNTER — PATIENT MESSAGE (OUTPATIENT)
Dept: INTERNAL MEDICINE CLINIC | Facility: CLINIC | Age: 44
End: 2024-06-18

## 2024-06-18 DIAGNOSIS — I15.2 HYPERTENSION DUE TO ENDOCRINE DISORDER: ICD-10-CM

## 2024-06-18 RX ORDER — METHIMAZOLE 5 MG/1
5 TABLET ORAL DAILY
COMMUNITY
Start: 2024-04-10 | End: 2025-04-05

## 2024-06-18 RX ORDER — AMLODIPINE BESYLATE 5 MG/1
5 TABLET ORAL DAILY
Qty: 90 TABLET | Refills: 1 | Status: SHIPPED | OUTPATIENT
Start: 2024-06-18

## 2024-06-18 NOTE — TELEPHONE ENCOUNTER
Future Appointments   Date Time Provider Department Center   7/23/2024 10:00 AM Juliann Dick, RAYRAY EM Dietary EM [facility]   11/15/2024 10:00 AM EDUARDO NILTON [facility] [address]   11/15/2024 11:00 AM Dennise Tony, KEE [facility] [address]

## 2024-06-18 NOTE — TELEPHONE ENCOUNTER
From: Saba Roach  To: Dennise Tony  Sent: 6/18/2024 8:43 AM CDT  Subject: Medication refill    Hello quick update thyroid is still not where I would like it to be and still working with the endocrinologist    In April, I believe it was the 15th. They put me back on the methimazole 5 µg.    I do feel better as far as energy mood and all that other great stuff so just wanted to let you all know that really quickly      Also I wanted to know is it possible for me to get my blood pressure medication sent to the pharmacist in the next few days    I’m requesting because my insurance starts over July 1, and I’m trying to keep my rate as low as possible

## 2024-06-29 DIAGNOSIS — I15.2 HYPERTENSION DUE TO ENDOCRINE DISORDER: ICD-10-CM

## 2024-06-30 RX ORDER — AMLODIPINE BESYLATE 5 MG/1
5 TABLET ORAL DAILY
Qty: 90 TABLET | Refills: 1 | OUTPATIENT
Start: 2024-06-30

## 2024-07-23 ENCOUNTER — HOSPITAL ENCOUNTER (OUTPATIENT)
Dept: NUTRITION | Facility: HOSPITAL | Age: 44
Discharge: HOME | End: 2024-07-23
Payer: COMMERCIAL

## 2024-07-23 DIAGNOSIS — E66.01 MORBID OBESITY (HCC): Primary | ICD-10-CM

## 2024-07-23 PROCEDURE — 97803 MED NUTRITION INDIV SUBSEQ: CPT | Performed by: DIETITIAN, REGISTERED

## 2024-07-23 NOTE — PROGRESS NOTES
Nutrition Assessment     Saba Roach is a 43 year old female.     Referred by: Attending  Referring Physician Name: Dennise Prarish      Medical Nutrition Therapy Comment: Weight loss counseling, morbid Obesity (E66.01)  Visit Information: Follow up Visit 7/23/24  30 minutes spent with patient     ANTHROPOMETRICS  HT: 5' 2.5\"  WT: No wt done today (7/23/24), 223 lb (3/18/24), 224 lb (2/23/24), 228 lb (12/2/23), 239 lb (10/21/23), 242 (7/11/23), Blind wt 238.9 (6/6/23), Declined weight today (4/29/23), 230.2 pounds (3/25/23)  BMI: 40.3  BMI CLASSIFICATION: greater than 40 kg/m2 - morbid obesity class III  Long term weight Goal: 150-160 pounds  Short term goal weight goal: 200 pounds     SIGNIFICANT MEDICAL Hx  Significant Past Medical Hx: HTN, Hyperthyroidism, IBS  Pertinent Lab Results: 1/6/23: T4 1.9, TSH <0.01   9/7/23: TSH with reflex  72.42, Free T4 0.08  10/19/23: TSH 0.14, T4 1.9  1/6/24: TSH with reflex 0.01, T4 2.8  3/14.24: TSH 0.01, Free T4 2.2  DIET HISTORY  Number of meals per day: 3  Number of snacks per day: 2  Comment: (3/25/23): Patient meeting with dietitian to work on a healthful eating plan to promote weight loss. She was recently dx with hypertension and is being managed with Metoprolol. She was also found to have hyperthyroid and is managed with Methlmazole. Patient is trying to make better food choices to help promote weight loss. She has a busy lifestyle with a 2 hour commute daily for work. She is trying to make more foods at home and add more produce. She has IBS and has been following a Low FODMAP diet which has helped to manage her abdominal bloating. Discussed a lower carbohydrate, high protein , low FODMAP diet to promote weight loss.   (4/29/23): Follow up for weight loss. Patient feels she has been making changes to diet to provide more protein, but focusing in carbohydrates portions has been too challenging at this time. She has a stressful job and long commute, so  planning ahead is important, but she needs some easy ideas to help meet her goals. Added more activity, going to gym 2-3 days per week in the last month. Overall, she feels more aware of her food choices and is not gravitating to poor quality snacks.    (6/6/23): Patient follow up for healthy eating plan. She feel more confident with her food choices. Not snacking regularly and not having cravings for junk foods. She is exercising more and making more meals at home. Overall, feels like she is making progress.   (7/29/23): Follow up for weight loss. Patient has been adjusting medication for her Grave's disease, She was hyperthyroid and is now hypothyroid, TSH increased from 0.69 on (5/8/23)  to 69.12 (7/8/23). Most recent lab was trending down to 47 (7/24/23). She is feeling better as far as energy with the changes in her TSH, she can now be more active. Her eating continues to improve with adding more protein to meals and feeling satiated. Some days she will crave more sweets, which coincides with increase in stress at work. Now, she is trying to be more aware of when she is hungry and needs to eat and when she is stress eating.    (9/9/23): Follow up for weight loss meal plan. Patient had adjustment made to Levothyroxine medication, now at 75 mcg daily. She feels like her energy is improving. She is making better food choices, does crave sweets, but limits to a couple of times per week at work. She wants to work in this habit. She is hopeful she will see some change in her weight with an improvement in her hormone levels.   (10/21/23): Follow up for weight loss. Weight is up at 239 pounds today. Her TSH lab is now in the normal range with TSH 01.4, T4 1.9. Her medication is being reduced to 75 mcg which will likely be her dose going forward. Saba is feeling more focused mentally, less fatigue and ready to now focus on diet and activity with her thyroid function greatly improved. She even notes that her BM have  been more regular. Overall, she is feeling well.  (12/2/23): Follow up for weight loss. Saba has been working with her endocrinologist on managing her thyroid. She is now taking levothyroxine 75 mcg 6 days per week and 1 day off. Her TSH level is below normal and Free T4 is elevated, but improving. She will follow up with Endo this month. He weight is down 11 pounds in 6 weeks. He energy is better, less lethargy. She still feels tried after a long work day, but no longer sleepy. Will continue with current plan, decreasing sweets and recommend eating out less often.  (2/24/24): Follow up for weight loss. Saba returns stating she has been off track since beginning of January due to illness and feeling more tired. She started back to the gym in the past 2 week. Food was more quick options of order out, and more sweets. Portions were not out of control, which is likely the reason her weight is down 4 pounds in the past 2.5 months. She is motivated to make changes to start eating healthier again and continue with her weight loss.  (3/19/24): Follow up for weight loss plan. Saba is feeling well, thyroid levels are close to the normal range. She will have more labs drown in 2 weeks to determine if her thyroid labs are improving and she can stay off the levothyroxine. She is wanting to try the Whole 30 meal plan, but feel emre there is not a good time to avoid specific food for 30 days. Discussed doing a shorter elimination period window and assess how she feels.  (5/7/24): Follow up for weight loss. Saba is feeling well, energy continues to be good most days. She is now exercising more regularly. Her goal is anything from 5 minutes to 60 minutes, as long as she gets out moving. Most activity is walking  and there are 2 days of strength training at the gym. She declined being weighed to day, but will have it checked at next follow up in June.   (6/11/24): Follow up for weight loss. Saba is feeling well.  She is now tracking her food in the [employer] zaki, average intake 9162-2781 calories. She notes that she is eating more sweets either near her menstrual cycle or when she is stressed at work. Discussed taking notes and seeing the motivation behind her eating to help make changes.   (7/23/24): Saba returns for follow up on weight reducing diet and lifestyle. She is feeling positive about changes to how she is eating in the past month. Her focus has been on having a good meal at breakfast and lunch and then she feels full and is less inclined to snack. She wants to add exercise back into her plan, which has not been happening recently. Overall, she is feeling motivated to continue to improve her health.      PHYSICAL ACTIVITY  Type: Cardio or strength  Frequency 3-5 days per week  Physical Assessment: Recommend add 30-60 minutes of activiy daily     Nutrition Diagnosis: Obesity due to excess ntake as evidenced by BMI > 40     Intervention      Nutrition Education: Recommended Modification  Nutrition/Diet Handouts Given: Weight Loss Diet Handouts:  Weight Management Packet Includes: Calorie/Carb Counting, Portion Size Control, Food Label Reading and Food Journal        NUTRITION PRESCRIPTION:                   Needs:  1500 Calorie       gm Protein                   Oral Diet Prescription: Balanced CHO 1500 Calorie        Goals      Nutrition Goals:   1. Pack a healthy afternoon snack and keep on your desk to remember to eat at work.2. Continue hydrating well.  3. Focus on food order: eat protein and vegetables first and then fruit or starches.  4. Exercise: Designate times for activity and engage in 5-10 minutes of that time walking outside to get in the habit of that time being for exercise. Build up time doing exercise gradually.  5. Open [employer] z[employer] daily and write a note in the \"notes\" section to build habit of using the zaki and eventually tracking.  6. Follow up with RD 9/21/24 @  9 am     Recommendation  to MD: none at this time     Assessment of Ability/Barriers      Patient and/or Family Will: Verbalize Understanding     Patient and/or Family Ability to Learn: Retain Information     Readiness to Learn: Motivated     Barriers to Learning: None           7/23/2024  Juliann Dick RD

## 2024-08-15 NOTE — PROGRESS NOTES
Saba Roach is a 43 year old female.  HPI:   HPI   Pt presents today for HTN f/u.  Does not check BP at home. Denies headache, vision changes.  Adherent to medication.    Pt's hyperthyroidism is being managed with endo. Recently switched from Methimazole to propylthiouracil because pt is trying to conceive.     Pt has been exercising at the gym twice a week. Watching her diet.    Current Outpatient Medications   Medication Sig Dispense Refill    propylthiouracil 50 MG Oral Tab Take 1 tablet (50 mg total) by mouth daily.      metoprolol tartrate 25 MG Oral Tab Take 1 tablet (25 mg total) by mouth 2 (two) times daily. 180 tablet 1    amLODIPine 5 MG Oral Tab Take 1 tablet (5 mg total) by mouth daily. 90 tablet 1    FOLIC ACID OR Take by mouth.      omega-3 fatty acids 1000 MG Oral Cap Take 1,000 mg by mouth daily.      Cholecalciferol (VITAMIN D) 125 MCG (5000 UT) Oral Cap       Probiotic Product (PRO-BIOTIC BLEND) Oral Cap       Cyanocobalamin (VITAMIN B 12 OR) Take by mouth daily.        Calcium Carbonate 1500 (600 Ca) MG Oral Tab Take 250 mg by mouth daily.      prenatal multivitamin plus DHA 27-0.8-228 MG Oral Cap Take 1 capsule by mouth daily.        Past Medical History:    IBS (irritable bowel syndrome)    Obesity    Tendinitis    L hand      Social History:  Social History     Socioeconomic History    Marital status:    Tobacco Use    Smoking status: Never     Passive exposure: Never    Smokeless tobacco: Never   Vaping Use    Vaping status: Never Used   Substance and Sexual Activity    Alcohol use: Not Currently     Alcohol/week: 0.0 standard drinks of alcohol    Drug use: Never    Sexual activity: Yes     Partners: Male        REVIEW OF SYSTEMS:   GENERAL HEALTH: feels well otherwise. Denies fever, chills, unintentional weight change  SKIN: denies any unusual skin lesions or rashes  RESPIRATORY: denies shortness of breath with exertion, denies cough or wheezing  CARDIOVASCULAR: denies chest pain  or palpitations, denies leg swelling  GI: denies abdominal pain and denies heartburn. Denies nausea, vomiting, diarrhea, constipation  NEURO: denies headaches, dizziness, weakness, syncope  PSYCH: denies anxiety, depression, insomnia    EXAM:   /85   Pulse 78   Temp 97.8 °F (36.6 °C) (Temporal)   Resp 14   Ht 5' 2\" (1.575 m)   Wt 239 lb (108.4 kg)   LMP 03/10/2024 (Exact Date)   SpO2 98%   BMI 43.71 kg/m²   GENERAL: well developed, well nourished,in no apparent distress  SKIN: no rashes,no suspicious lesions, warm and dry  HEENT: atraumatic, normocephalic,ears and throat are clear  NECK: supple,no adenopathy, no thyromegaly  LUNGS: clear to auscultation b/l no W/R/R  CARDIO: RRR without murmur  GI: good BS's,no masses, HSM, distension or tenderness  EXTREMITIES: no cyanosis, clubbing or edema  MUSCULOSKELETAL: FROM, no joint swelling or bony tenderness  NEURO: a/ox3, no focal deficits  PSYCH: mood and affect normal    ASSESSMENT AND PLAN:     Encounter Diagnoses   Name Primary?    Hypertension due to endocrine disorder  -discussed with the patient due to attempting to conceive will need to switch BP medications that would be safer for pregnancy such as Labetalol. Pt prefer to see cardiology to come up with the plan for right now. Yes    Hypothyroidism due to medication  -controlled. Managed by endo      Pt has mammogram scheduled in November.  Requested Prescriptions      No prescriptions requested or ordered in this encounter         The patient indicates understanding of these issues and agrees to the plan.  The patient is asked to return in  for annual physical.

## 2024-08-16 ENCOUNTER — OFFICE VISIT (OUTPATIENT)
Dept: INTERNAL MEDICINE CLINIC | Facility: CLINIC | Age: 44
End: 2024-08-16
Payer: COMMERCIAL

## 2024-08-16 VITALS
SYSTOLIC BLOOD PRESSURE: 135 MMHG | WEIGHT: 239 LBS | BODY MASS INDEX: 43.98 KG/M2 | DIASTOLIC BLOOD PRESSURE: 85 MMHG | OXYGEN SATURATION: 98 % | HEART RATE: 78 BPM | TEMPERATURE: 98 F | HEIGHT: 62 IN | RESPIRATION RATE: 14 BRPM

## 2024-08-16 DIAGNOSIS — E03.2 HYPOTHYROIDISM DUE TO MEDICATION: ICD-10-CM

## 2024-08-16 DIAGNOSIS — I15.2 HYPERTENSION DUE TO ENDOCRINE DISORDER: Primary | ICD-10-CM

## 2024-08-16 PROCEDURE — 99214 OFFICE O/P EST MOD 30 MIN: CPT

## 2024-08-16 RX ORDER — PROPYLTHIOURACIL 50 MG/1
50 TABLET ORAL DAILY
COMMUNITY

## 2024-09-18 DIAGNOSIS — I15.2 HYPERTENSION DUE TO ENDOCRINE DISORDER: ICD-10-CM

## 2024-09-19 RX ORDER — METOPROLOL TARTRATE 25 MG/1
25 TABLET, FILM COATED ORAL 2 TIMES DAILY
Qty: 180 TABLET | Refills: 1 | OUTPATIENT
Start: 2024-09-19

## 2024-09-19 RX ORDER — AMLODIPINE BESYLATE 5 MG/1
5 TABLET ORAL DAILY
Qty: 90 TABLET | Refills: 1 | OUTPATIENT
Start: 2024-09-19

## 2024-09-19 NOTE — TELEPHONE ENCOUNTER
Amlodipine 5 MG  Last time medication was refilled 06/18/2024  Last office visit  08/16/2024  Next office visit due/scheduled   Future Appointments   Date Time Provider Department Center   10/10/2024  1:00 PM Ga Ibarra PA-C [facility]   11/15/2024 10:00 AM EDUARDO [facility] B[facility] [employer] [address]   11/15/2024 11:00 AM ChavoDennise ling, APRN [facility] [address]   2/14/2025 10:30 AM ChavoMarkella, APRN [facility] [address]   8/8/2025 10:30 AM ChavoMarkella, APRN [facility] [address]         Metoprolol 25 MG  Last time medication was refilled 06/18/2024  Last office visit  08/16/2024  Next office visit due/scheduled   Future Appointments   Date Time Provider Department Center   10/10/2024  1:00 PM Ga Ibarra PA-C [facility]   11/15/2024 10:00 AM [facility]  [address]   11/15/2024 11:00 AM ChavoMarkella, APRN [facility] [address] [facility] [address]   2/14/2025 10:30 AM ChavoDennise ling, APRN [facility] [address]   8/8/2025 10:30 AM ChavoMarkella, APRN [facility] [address]       Refill request too soon, Wishdates Message Sent.

## 2024-10-29 ENCOUNTER — HOSPITAL ENCOUNTER (OUTPATIENT)
Dept: NUTRITION | Facility: HOSPITAL | Age: 44
Discharge: HOME | End: 2024-10-29
Attending: INTERNAL MEDICINE
Payer: COMMERCIAL

## 2024-10-29 ENCOUNTER — TELEPHONE (OUTPATIENT)
Dept: INTERNAL MEDICINE CLINIC | Facility: CLINIC | Age: 44
End: 2024-10-29

## 2024-10-29 DIAGNOSIS — E66.01 MORBID OBESITY (HCC): Primary | ICD-10-CM

## 2024-10-29 PROCEDURE — 97803 MED NUTRITION INDIV SUBSEQ: CPT | Performed by: DIETITIAN, REGISTERED

## 2024-10-29 NOTE — PROGRESS NOTES
Nutrition Assessment     Saba Roach is a 44 year old female.     Referred by: Attending  Referring Physician Name: Dennise Parrish      Medical Nutrition Therapy Comment: Weight loss counseling, morbid Obesity (E66.01)  Visit Information: Follow up Visit 10/29/24  30 minutes spent with patient     ANTHROPOMETRICS  HT: 5' 2.5\"  WT: 239 lb (10/18/24), 223 lb (3/18/24), 224 lb (2/23/24), 228 lb (12/2/23), 239 lb (10/21/23), 242 (7/11/23), Blind wt 238.9 (6/6/23), Declined weight today (4/29/23), 230.2 pounds (3/25/23)  BMI: 43  BMI CLASSIFICATION: greater than 40 kg/m2 - morbid obesity class III  Long term weight Goal: 150-160 pounds  Short term goal weight goal: 200 pounds     SIGNIFICANT MEDICAL Hx  Significant Past Medical Hx: HTN, Hyperthyroidism, IBS  Pertinent Lab Results: 1/6/23: T4 1.9, TSH <0.01   9/7/23: TSH with reflex  72.42, Free T4 0.08  10/19/23: TSH 0.14, T4 1.9  1/6/24: TSH with reflex 0.01, T4 2.8  3/14.24: TSH 0.01, Free T4 2.2  DIET HISTORY  Number of meals per day: 3  Number of snacks per day: 2  Comment: (3/25/23): Patient meeting with dietitian to work on a healthful eating plan to promote weight loss. She was recently dx with hypertension and is being managed with Metoprolol. She was also found to have hyperthyroid and is managed with Methlmazole. Patient is trying to make better food choices to help promote weight loss. She has a busy lifestyle with a 2 hour commute daily for work. She is trying to make more foods at home and add more produce. She has IBS and has been following a Low FODMAP diet which has helped to manage her abdominal bloating. Discussed a lower carbohydrate, high protein , low FODMAP diet to promote weight loss.   (4/29/23): Follow up for weight loss. Patient feels she has been making changes to diet to provide more protein, but focusing in carbohydrates portions has been too challenging at this time. She has a stressful job and long commute, so planning  ahead is important, but she needs some easy ideas to help meet her goals. Added more activity, going to gym 2-3 days per week in the last month. Overall, she feels more aware of her food choices and is not gravitating to poor quality snacks.    (6/6/23): Patient follow up for healthy eating plan. She feel more confident with her food choices. Not snacking regularly and not having cravings for junk foods. She is exercising more and making more meals at home. Overall, feels like she is making progress.   (7/29/23): Follow up for weight loss. Patient has been adjusting medication for her Grave's disease, She was hyperthyroid and is now hypothyroid, TSH increased from 0.69 on (5/8/23)  to 69.12 (7/8/23). Most recent lab was trending down to 47 (7/24/23). She is feeling better as far as energy with the changes in her TSH, she can now be more active. Her eating continues to improve with adding more protein to meals and feeling satiated. Some days she will crave more sweets, which coincides with increase in stress at work. Now, she is trying to be more aware of when she is hungry and needs to eat and when she is stress eating.    (9/9/23): Follow up for weight loss meal plan. Patient had adjustment made to Levothyroxine medication, now at 75 mcg daily. She feels like her energy is improving. She is making better food choices, does crave sweets, but limits to a couple of times per week at work. She wants to work in this habit. She is hopeful she will see some change in her weight with an improvement in her hormone levels.   (10/21/23): Follow up for weight loss. Weight is up at 239 pounds today. Her TSH lab is now in the normal range with TSH 01.4, T4 1.9. Her medication is being reduced to 75 mcg which will likely be her dose going forward. Saba is feeling more focused mentally, less fatigue and ready to now focus on diet and activity with her thyroid function greatly improved. She even notes that her BM have been more  regular. Overall, she is feeling well.  (12/2/23): Follow up for weight loss. Saba has been working with her endocrinologist on managing her thyroid. She is now taking levothyroxine 75 mcg 6 days per week and 1 day off. Her TSH level is below normal and Free T4 is elevated, but improving. She will follow up with Endo this month. He weight is down 11 pounds in 6 weeks. He energy is better, less lethargy. She still feels tried after a long work day, but no longer sleepy. Will continue with current plan, decreasing sweets and recommend eating out less often.  (2/24/24): Follow up for weight loss. Saba returns stating she has been off track since beginning of January due to illness and feeling more tired. She started back to the gym in the past 2 week. Food was more quick options of order out, and more sweets. Portions were not out of control, which is likely the reason her weight is down 4 pounds in the past 2.5 months. She is motivated to make changes to start eating healthier again and continue with her weight loss.  (3/19/24): Follow up for weight loss plan. Saba is feeling well, thyroid levels are close to the normal range. She will have more labs drown in 2 weeks to determine if her thyroid labs are improving and she can stay off the levothyroxine. She is wanting to try the Whole 30 meal plan, but feel emre there is not a good time to avoid specific food for 30 days. Discussed doing a shorter elimination period window and assess how she feels.  (5/7/24): Follow up for weight loss. Saba is feeling well, energy continues to be good most days. She is now exercising more regularly. Her goal is anything from 5 minutes to 60 minutes, as long as she gets out moving. Most activity is walking  and there are 2 days of strength training at the gym. She declined being weighed to day, but will have it checked at next follow up in June.   (6/11/24): Follow up for weight loss. Saba is feeling well. She is now  tracking her food in the Lose It zaki, average intake 5471-4547 calories. She notes that she is eating more sweets either near her menstrual cycle or when she is stressed at work. Discussed taking notes and seeing the motivation behind her eating to help make changes.   (7/23/24): Saba returns for follow up on weight reducing diet and lifestyle. She is feeling positive about changes to how she is eating in the past month. Her focus has been on having a good meal at breakfast and lunch and then she feels full and is less inclined to snack. She wants to add exercise back into her plan, which has not been happening recently. Overall, she is feeling motivated to continue to improve her health.  (10/29/24): follow up for weight loss. Saba weight has increased to 239. Her Grave disease is being managed by her endocrinologist and she feels better overall. Now she wants to focus more on her diet. She is stressed at her job and realizes that she will crave sweets as a result. At home, she is able to make better food choices and not have sweet cravings. Discussed adjusting her diet to add more protein at each meal. Also recommend add regular exercise, this is infrequent now. Saba does like strength training and has equipment at home, so this would be an easier option then going to the gym to do cardio.     PHYSICAL ACTIVITY  Type: Cardio or strength  Frequency 3-5 days per week  Physical Assessment: Recommend add 30-60 minutes of activiy daily     Nutrition Diagnosis: Obesity due to excess ntake as evidenced by BMI > 40     Intervention      Nutrition Education: Recommended Modification  Nutrition/Diet Handouts Given: Weight Loss Diet Handouts:  Weight Management Packet Includes: Calorie/Carb Counting, Portion Size Control, Food Label Reading and Food Journal        NUTRITION PRESCRIPTION:                   Needs:  1500 Calorie       gm Protein                   Oral Diet Prescription: Balanced CHO 1500  Calorie        Goals      Nutrition Goals:   1. Increase protein intake to  grams per day.  2. Focus on food order: eat protein and vegetables first and then fruit or starches.  3. Exercise:add strength training 2 days per week.   4. Use Prepared Response zaki to identify foods free of harmful additives.   5. Follow up with RD 12/7/24 @  9 am     Recommendation to MD: none at this time     Assessment of Ability/Barriers      Patient and/or Family Will: Verbalize Understanding     Patient and/or Family Ability to Learn: Retain Information     Readiness to Learn: Motivated     Barriers to Learning: None           10/29/2024  Juliann Dick RD

## 2024-11-14 RX ORDER — NIFEDIPINE 30 MG
30 TABLET, EXTENDED RELEASE ORAL DAILY
COMMUNITY
Start: 2024-10-18 | End: 2025-10-13

## 2024-11-14 NOTE — PROGRESS NOTES
Wellness Exam    CC: Patient is presenting for a wellness exam    HPI:   Concerns: None. Normal states of health.  Pt follows with cardiology. BP medication are switched for safety of future pregnancy.    Follows with endo for Grave's disease.    Pertinent Family History:   Family History   Problem Relation Age of Onset    Breast Cancer Mother 51    Cancer Mother     Heart Attack Father     Diabetes Father     Hypertension Father     Obesity Father     Heart Attack Paternal Grandmother     Cancer Paternal Grandmother         thyroid    Heart Attack Paternal Grandfather     Dementia Maternal Grandmother     Diabetes Maternal Grandmother     Depression Sister     Hypertension Sister     Lipids Sister     Obesity Sister     Psychiatric Sister     Hypertension Brother         Gyne:     Health Maintenance   Topic Date Due    Pap Smear  10/19/2025            Denies pelvic pain, abnormal discharge or genital lesions.    Last mammogram:    Health Maintenance   Topic Date Due    Mammogram  11/15/2025      Regular self breast exam: discussed.    Bone Health: Last DEXA: N/A  Osteoporosis prevention: weight resistant exercises, check vitamin D  Last colonoscopy:  No recommendations at this time     Reported Health: good.  Diet is regular, exercise: regular.    Past Medical History:    Allergic rhinitis    Anxiety    Autoimmune thyroiditis    Essential hypertension    Hyperthyroidism    Hypothyroidism    IBS (irritable bowel syndrome)    Obesity    Tendinitis    L hand     Past Surgical History:   Procedure Laterality Date    Colposcopy,bx cervix/endocerv curr  2010    HPV+, LSIL, subsequent paps have been normal      Social History     Socioeconomic History    Marital status:    Tobacco Use    Smoking status: Never     Passive exposure: Never    Smokeless tobacco: Never   Vaping Use    Vaping status: Never Used   Substance and Sexual Activity    Alcohol use: Not Currently    Drug use: Never    Sexual activity: Yes      Partners: Male   Other Topics Concern    Caffeine Concern No    Exercise Yes    Seat Belt Yes    Special Diet Yes    Stress Concern Yes    Weight Concern Yes     Medications Ordered Prior to Encounter[1]    Review of Systems   Review of Systems   Constitutional: Negative for fever, chills and fatigue.   HENT: Negative for hearing loss, congestion, sore throat and neck pain.    Eyes: Negative for pain and visual disturbance.   Respiratory: Negative for cough and shortness of breath.    Cardiovascular: Negative for chest pain and palpitations.   Gastrointestinal: Negative for nausea, vomiting, abdominal pain and diarrhea.   Genitourinary: Negative for urgency, frequency of urination, and abnormal vaginal bleeding.   Musculoskeletal: Negative for arthralgias and gait problem.   Skin: Negative for color change and rash.   Neurological: Negative for tremors, weakness and numbness.   Hematological: Negative for adenopathy. Does not bruise/bleed easily.   Psychiatric/Behavioral: Negative for confusion and agitation. The patient is not nervous/anxious.      /82   Pulse 84   Temp 98.2 °F (36.8 °C) (Temporal)   Resp 18   Ht 5' 2\" (1.575 m)   Wt 238 lb (108 kg)   LMP 03/10/2024 (Exact Date)   SpO2 100%   BMI 43.53 kg/m²   Physical Exam  Physical Exam    Constitutional: She is oriented to person, place, and time. She appears well-developed. No distress.    Head: Normocephalic and atraumatic.   Eyes: EOM are normal. Pupils are equal, round, and reactive to light. No scleral icterus. Fundoscopic exam: No hemorrhages, A/V nicking, exudates or papilledema.  ENT: TM's clear, nose normal, no oropharyngeal exudates or tonsillar hypertrophy    Neck: Normal range of motion. No thyromegaly present.   Cardiovascular: Normal rate, regular rhythm and normal heart sounds.  No murmur or friction rub heard.  Pulmonary/Chest: Effort normal and breath sounds normal bilaterally. She has no wheezes or rales.   Breasts:  deferred  Abdominal: Soft. Bowel sounds are normal. There is no tenderness. No HSM.  Musculoskeletal: Normal range of motion. She exhibits no edema.   Lymphadenopathy: She has no cervical, supraclavicular, or axillary adenopathy.   : deferred  Neurological: She is alert and oriented to person, place, and time. DTRs are +2 and symmetric. Cranial nerves grossly intact.  Skin: Skin is warm. No rash noted. No erythema, pallor or jaundice.   Psychiatric: She has a normal mood and affect and her behavior is normal.     Assessment and Plan:  Saba Roach is a 44 year old female here for a wellness exam.    Diagnoses and all orders for this visit:    Annual physical exam    Laboratory exam ordered as part of routine general medical examination  -     CBC With Differential With Platelet  -     Comp Metabolic Panel (14)  -     Lipid Panel  -     Urinalysis, Routine    Encounter for vitamin deficiency screening  -     VITAMIN D, 25-HYDROXY [57514][Q]     Pt will get her influenza vaccine when next week at her pharmacy.    Age appropriate cancer screening, labs, safety, immunizations were discussed with the patient and ordered as follows:    Health Maintenance Due   Topic Date Due    Influenza Vaccine (1) 10/01/2024    Annual Physical  11/10/2024    Mammogram  11/10/2024      Orders Placed This Encounter   Procedures    CBC With Differential With Platelet    Comp Metabolic Panel (14)    Lipid Panel    Urinalysis, Routine    VITAMIN D, 25-HYDROXY [[id number]][Q]     Order Specific Question:   Release to patient     Answer:   Immediate     Discussed use of sunscreen, wearing seatbelt, recommend regular cardiovascular and weight bearing exercise as well as a well-rounded diet.    Her 5 year prevention plan includes: annual physical and labs, 30 minutes exercise most days of week and heart healthy diet  Patient/Caregiver Education:  Patient/Caregiver Education: There are no barriers to learning. Medical education done.  Outcome:  Patient verbalizes understanding.       Return in 12m for annual physical. Return in 6m for med check.  Educated by: ALTHEA Vigil         [1]   Current Outpatient Medications on File Prior to Visit   Medication Sig Dispense Refill    NIFEdipine ER 30 MG Oral Tablet 24 Hr Take 1 tablet (30 mg total) by mouth daily.      labetalol 100 MG Oral Tab Take 1 tablet (100 mg total) by mouth 2 (two) times daily. Managed by cardiologist Dr. Arce      FOLIC ACID OR Take by mouth.      omega-3 fatty acids 1000 MG Oral Cap Take 1,000 mg by mouth daily.      Cholecalciferol (VITAMIN D) 125 MCG (5000 UT) Oral Cap       Probiotic Product (PRO-BIOTIC BLEND) Oral Cap       Cyanocobalamin (VITAMIN B 12 OR) Take by mouth daily.        Calcium Carbonate 1500 (600 Ca) MG Oral Tab Take 250 mg by mouth daily.      prenatal multivitamin plus DHA 27-0.8-228 MG Oral Cap Take 1 capsule by mouth daily.       No current facility-administered medications on file prior to visit.

## 2024-11-15 ENCOUNTER — OFFICE VISIT (OUTPATIENT)
Dept: INTERNAL MEDICINE CLINIC | Facility: CLINIC | Age: 44
End: 2024-11-15
Payer: COMMERCIAL

## 2024-11-15 ENCOUNTER — OFFICE VISIT (OUTPATIENT)
Dept: OBGYN CLINIC | Facility: CLINIC | Age: 44
End: 2024-11-15
Payer: COMMERCIAL

## 2024-11-15 ENCOUNTER — HOSPITAL ENCOUNTER (OUTPATIENT)
Dept: MAMMOGRAPHY | Age: 44
Discharge: HOME | End: 2024-11-15
Attending: INTERNAL MEDICINE
Payer: COMMERCIAL

## 2024-11-15 VITALS
HEART RATE: 72 BPM | SYSTOLIC BLOOD PRESSURE: 150 MMHG | DIASTOLIC BLOOD PRESSURE: 88 MMHG | WEIGHT: 238 LBS | BODY MASS INDEX: 44 KG/M2

## 2024-11-15 VITALS
TEMPERATURE: 98 F | HEART RATE: 84 BPM | RESPIRATION RATE: 18 BRPM | BODY MASS INDEX: 43.79 KG/M2 | OXYGEN SATURATION: 100 % | DIASTOLIC BLOOD PRESSURE: 82 MMHG | HEIGHT: 62 IN | SYSTOLIC BLOOD PRESSURE: 136 MMHG | WEIGHT: 238 LBS

## 2024-11-15 DIAGNOSIS — Z00.00 LABORATORY EXAM ORDERED AS PART OF ROUTINE GENERAL MEDICAL EXAMINATION: ICD-10-CM

## 2024-11-15 DIAGNOSIS — I15.2 HYPERTENSION DUE TO ENDOCRINE DISORDER: ICD-10-CM

## 2024-11-15 DIAGNOSIS — Z12.4 SCREENING FOR CERVICAL CANCER: Primary | ICD-10-CM

## 2024-11-15 DIAGNOSIS — Z00.00 ANNUAL PHYSICAL EXAM: Primary | ICD-10-CM

## 2024-11-15 DIAGNOSIS — Z13.21 ENCOUNTER FOR VITAMIN DEFICIENCY SCREENING: ICD-10-CM

## 2024-11-15 DIAGNOSIS — E05.90 HYPERTHYROIDISM: ICD-10-CM

## 2024-11-15 DIAGNOSIS — Z02.89 SELF-REFERRAL: ICD-10-CM

## 2024-11-15 DIAGNOSIS — Z01.419 ENCOUNTER FOR WELL WOMAN EXAM: ICD-10-CM

## 2024-11-15 DIAGNOSIS — Z12.31 ENCOUNTER FOR SCREENING MAMMOGRAM FOR MALIGNANT NEOPLASM OF BREAST: ICD-10-CM

## 2024-11-15 PROCEDURE — 99203 OFFICE O/P NEW LOW 30 MIN: CPT | Performed by: STUDENT IN AN ORGANIZED HEALTH CARE EDUCATION/TRAINING PROGRAM

## 2024-11-15 PROCEDURE — 77063 BREAST TOMOSYNTHESIS BI: CPT | Performed by: INTERNAL MEDICINE

## 2024-11-15 PROCEDURE — 77067 SCR MAMMO BI INCL CAD: CPT | Performed by: INTERNAL MEDICINE

## 2024-11-15 PROCEDURE — 99396 PREV VISIT EST AGE 40-64: CPT

## 2024-11-15 PROCEDURE — 99386 PREV VISIT NEW AGE 40-64: CPT | Performed by: STUDENT IN AN ORGANIZED HEALTH CARE EDUCATION/TRAINING PROGRAM

## 2024-11-15 NOTE — PROGRESS NOTES
[facility]  Obstetrics and Gynecology  Annual  Ga Ibarra PA-C    Chief Complaint   Patient presents with    New Patient    Gynecologic Exam     Pap       Saba Roach is a 44 year old female  presenting for her annual well woman exam and to discuss fertility. PMH significant for hyperthyroidism, hypertension secondary to thyroid dysfunction, and BMI of 43. She is currently following with both cardiology and endocrinology who have both switched her medications to ones safe in pregnancy. Patient's last menstrual period was 10/29/2024 (exact date). Periods are regular. No previous pregnancies. States her and her partner have been trying on and off for the last year to conceive. She denies any abnormal vaginal discharge, odor, irritation, or itching. No breast pain or masses. No dysuria or hematuria.    Pap:   Contraception:none  Mammo: scheduled    OBSTETRICS HISTORY:     OB History    Para Term  AB Living   0 0 0 0 0 0   SAB IAB Ectopic Multiple Live Births                   GYNE HISTORY:     Menarche: 13y/o (11/15/2024 11:05 AM)  Period Cycle (Days): 28days (11/15/2024 11:05 AM)  Period Duration (Days): 4-5days (11/15/2024 11:05 AM)  Period Flow: Varies (11/15/2024 11:05 AM)  Use of Birth Control (if yes, specify type): None (11/15/2024 11:05 AM)  Hx Prior Abnormal Pap: No (11/15/2024 11:05 AM)      History   Sexual Activity    Sexual activity: Yes    Partners: Male           Latest Ref Rng & Units 10/19/2020    11:55 AM   RECENT PAP RESULTS   INTERPRETATION/RESULT: Negative for intraepithelial lesion or malignancy Negative for intraepithelial lesion or malignancy    HPV Negative Negative          MEDICAL HISTORY:     Past Medical History:   Diagnosis Date    Autoimmune thyroiditis     IBS (irritable bowel syndrome)     Obesity     Tendinitis 2024    L hand      Past Surgical History:   Procedure Laterality Date    Colposcopy,bx cervix/endocerv curr      HPV+, LSIL, subsequent  paps have been normal        SOCIAL HISTORY:     Tobacco Use: Low Risk  (11/15/2024)    Patient History     Smoking Tobacco Use: Never     Smokeless Tobacco Use: Never     Passive Exposure: Never       Depression Screening:   Depression Screening (PHQ-2/PHQ-9): Over the LAST 2 WEEKS                        FAMILY HISTORY:     Family History   Problem Relation Age of Onset    Breast Cancer Mother 51    Heart Attack Father     Heart Attack Paternal Grandmother     Cancer Paternal Grandmother         thyroid    Heart Attack Paternal Grandfather        MEDICATIONS:       Current Outpatient Medications:     NIFEdipine ER 30 MG Oral Tablet 24 Hr, Take 1 tablet (30 mg total) by mouth daily., Disp: , Rfl:     labetalol 100 MG Oral Tab, Take 1 tablet (100 mg total) by mouth 2 (two) times daily. Managed by cardiologist Dr. Arce, Disp: , Rfl:     propylthiouracil 50 MG Oral Tab, Take 1 tablet (50 mg total) by mouth daily., Disp: , Rfl:     FOLIC ACID OR, Take by mouth., Disp: , Rfl:     omega-3 fatty acids 1000 MG Oral Cap, Take 1,000 mg by mouth daily., Disp: , Rfl:     Cholecalciferol (VITAMIN D) 125 MCG (5000 UT) Oral Cap, , Disp: , Rfl:     Probiotic Product (PRO-BIOTIC BLEND) Oral Cap, , Disp: , Rfl:     Cyanocobalamin (VITAMIN B 12 OR), Take by mouth daily.  , Disp: , Rfl:     Calcium Carbonate 1500 (600 Ca) MG Oral Tab, Take 250 mg by mouth daily., Disp: , Rfl:     prenatal multivitamin plus DHA 27-0.8-228 MG Oral Cap, Take 1 capsule by mouth daily., Disp: , Rfl:     ALLERGIES:     Allergies[1]    REVIEW OF SYSTEMS:     Review of Systems   Constitutional:  Negative for chills, fever and unexpected weight change.   Respiratory: Negative.     Cardiovascular: Negative.    Gastrointestinal:  Negative for abdominal pain, constipation, diarrhea and nausea.   Genitourinary:  Negative for dyspareunia, dysuria, genital sores, hematuria, menstrual problem, pelvic pain, vaginal bleeding, vaginal discharge and vaginal pain.    Musculoskeletal: Negative.    Skin: Negative.    Neurological: Negative.    Hematological: Negative.    Psychiatric/Behavioral: Negative.           PHYSICAL EXAM:     Vitals:    11/15/24 1103 11/15/24 1211   BP: (!) 167/89 150/88   Pulse: 93 72   Weight: 238 lb (108 kg)        Body mass index is 43.53 kg/m².     Constitutional: well developed, well nourished  Psychiatric:  Oriented to time, place, person and situation. Appropriate mood and affect  Head/Face: normocephalic  Neck/Thyroid: thyroid symmetric, no thyromegaly, no nodules, no adenopathy  Lymphatic:no abnormal supraclavicular or axillary adenopathy is noted  Breast: normal without palpable masses, tenderness, asymmetry, nipple discharge, nipple retraction or skin changes  Abdomen:  soft, nontender, nondistended, no masses  Skin/Hair: no unusual rashes or bruises  Extremities: no edema, no cyanosis    Pelvic Exam:  External Genitalia: normal appearance, hair distribution, and no lesions  Urethral Meatus:  normal in size, location, without lesions and prolapse  Bladder:  No fullness, masses or tenderness  Vagina:  Normal appearance without lesions, no abnormal discharge  Cervix:  Normal without tenderness on motion  Uterus: normal in size, contour, position, mobility, without tenderness  Adnexa: normal without masses or tenderness  Perineum: normal  Anus: no hemorroids       ASSESSMENT:     Saba was seen today for new patient and gynecologic exam.    Diagnoses and all orders for this visit:    Screening for cervical cancer  -     ThinPrep PAP Smear; Future  -     Hpv Dna  High Risk , Thin Prep Collect; Future  -     ThinPrep PAP Smear  -     Hpv Dna  High Risk , Thin Prep Collect    Hyperthyroidism  -     Maternal Fetal Medicine Referral - [facility]    Hypertension due to endocrine disorder    Encounter for well woman exam        PLAN:   Normal exam.  Pap smear done.   Recommend repeat pap smear with co-testing every 3 years for normal/  -HPV.  Recommend annual screening mammograms.   Recommend screening colonoscopy starting at 50 or earlier if significant family history or concerning symptoms.   Contraceptive Counseling as needed.   Maintain healthy lifestyle with well-balance diet and daily exercise.  Return to clinic in one year or as needed.    I discussed that if she does become pregnant it will be considered a high risk pregnancy given her age, BMI, and chronic conditions.   We discussed menstrual cycle, ovulation, and well-timed unprotected intercourse. We discussed the possibility of evaluation for infertility in case there has been no conception after 12 months (for women less than aged 35) or 6 months (for women greater than 35). We discussed beginning a daily prenatal or multi-vitamin containing 400 mcg of folic acid. We discussed healthy diet, exercise, and maintenance of a normal body weight. We also discussed optional preconceptional tests such Rubella IgG and CF, SMA carrier screening.   We discussed timing of initial prenatal visit - I recommended scheduling first appointment 3-4 weeks after missed menses/+pregnancy test (see below). All questions were answered.   Information for [facility] provided. Referral to [facility] given patient's history for preconception counseling provided.     This is a 40 minute visit and greater than 50% of the time was spent counseling the patient and/or coordinating care.      SUMMARY:  Pap: Next cotest 3 years per ASCCP guidelines.  BCM:  None  STD screening: declines  Mammogram: has order from PCP  HM updated    FOLLOW-UP     No follow-ups on file.    MIKO BOB PA-C  11:13 AM  11/15/2024    Note to patient and family:  The 21st Century Cures Act makes medical notes available to patients in the interest of transparency.  However, please be advised that this is a medical document.  It is intended as a peer to peer communication.  It is written in medical language and may contain abbreviations or verbiage  that are technical and unfamiliar.  It may appear blunt or direct.  Medical documents are intended to carry relevant information, facts as evident, and the clinical opinion of the practitioner.       [1]   Allergies  Allergen Reactions    Cephalexin      Unknown reaction Mother  so can not confirm reaction    Erythromycin      Unkown side effect Mother  can not confirm type of reaction    Novoswald Dh      As child unkown reaction

## 2024-11-15 NOTE — PATIENT INSTRUCTIONS
The following are options for reproductive endocrinology consultations.    [facility]  Dr. Shanita Hollingsworth M.D., MSCI and Dr. Abdiel Eden M.D., M.P.H.  [address] [address]  [phone]  [phone]    [facility]  Dr. Mat Holcomb and Dr. Goldy Michael  [address] [place] [id number]  Tel: [phone]  Fax: [phone]    [facility]  Damien Edouard MD  [address] [place] [id number]  Phone: [phone]

## 2024-11-18 LAB — HPV E6+E7 MRNA CVX QL NAA+PROBE: NEGATIVE

## 2024-12-07 ENCOUNTER — HOSPITAL ENCOUNTER (OUTPATIENT)
Dept: NUTRITION | Facility: HOSPITAL | Age: 44
Discharge: HOME | End: 2024-12-07
Attending: INTERNAL MEDICINE
Payer: COMMERCIAL

## 2024-12-07 DIAGNOSIS — E66.01 MORBID OBESITY (HCC): Primary | ICD-10-CM

## 2024-12-07 PROCEDURE — 97803 MED NUTRITION INDIV SUBSEQ: CPT | Performed by: DIETITIAN, REGISTERED

## 2024-12-07 NOTE — PROGRESS NOTES
Nutrition Assessment     Saba Roach is a 44 year old female.     Referred by: Attending  Referring Physician Name: Dennise Parrish      Medical Nutrition Therapy Comment: Weight loss counseling, morbid Obesity (E66.01)  Visit Information: Follow up Visit 12/7/24  30 minutes spent with patient     ANTHROPOMETRICS  HT: 5' 2.5\"  WT: 238 (11/15/24), 239 lb (10/18/24), 223 lb (3/18/24), 224 lb (2/23/24), 228 lb (12/2/23), 239 lb (10/21/23), 242 (7/11/23), Blind wt 238.9 (6/6/23), Declined weight today (4/29/23), 230.2 pounds (3/25/23)  BMI: 43  BMI CLASSIFICATION: greater than 40 kg/m2 - morbid obesity class III  Long term weight Goal: 150-160 pounds  Short term goal weight goal: 200 pounds     SIGNIFICANT MEDICAL Hx  Significant Past Medical Hx: HTN, Hyperthyroidism, IBS  Pertinent Lab Results: 1/6/23: T4 1.9, TSH <0.01   9/7/23: TSH with reflex  72.42, Free T4 0.08  10/19/23: TSH 0.14, T4 1.9  1/6/24: TSH with reflex 0.01, T4 2.8  3/14.24: TSH 0.01, Free T4 2.2  DIET HISTORY  Number of meals per day: 3  Number of snacks per day: 2  Comment: (3/25/23): Patient meeting with dietitian to work on a healthful eating plan to promote weight loss. She was recently dx with hypertension and is being managed with Metoprolol. She was also found to have hyperthyroid and is managed with Methlmazole. Patient is trying to make better food choices to help promote weight loss. She has a busy lifestyle with a 2 hour commute daily for work. She is trying to make more foods at home and add more produce. She has IBS and has been following a Low FODMAP diet which has helped to manage her abdominal bloating. Discussed a lower carbohydrate, high protein , low FODMAP diet to promote weight loss.   (4/29/23): Follow up for weight loss. Patient feels she has been making changes to diet to provide more protein, but focusing in carbohydrates portions has been too challenging at this time. She has a stressful job and long commute,  so planning ahead is important, but she needs some easy ideas to help meet her goals. Added more activity, going to gym 2-3 days per week in the last month. Overall, she feels more aware of her food choices and is not gravitating to poor quality snacks.    (6/6/23): Patient follow up for healthy eating plan. She feel more confident with her food choices. Not snacking regularly and not having cravings for junk foods. She is exercising more and making more meals at home. Overall, feels like she is making progress.   (7/29/23): Follow up for weight loss. Patient has been adjusting medication for her Grave's disease, She was hyperthyroid and is now hypothyroid, TSH increased from 0.69 on (5/8/23)  to 69.12 (7/8/23). Most recent lab was trending down to 47 (7/24/23). She is feeling better as far as energy with the changes in her TSH, she can now be more active. Her eating continues to improve with adding more protein to meals and feeling satiated. Some days she will crave more sweets, which coincides with increase in stress at work. Now, she is trying to be more aware of when she is hungry and needs to eat and when she is stress eating.    (9/9/23): Follow up for weight loss meal plan. Patient had adjustment made to Levothyroxine medication, now at 75 mcg daily. She feels like her energy is improving. She is making better food choices, does crave sweets, but limits to a couple of times per week at work. She wants to work in this habit. She is hopeful she will see some change in her weight with an improvement in her hormone levels.   (10/21/23): Follow up for weight loss. Weight is up at 239 pounds today. Her TSH lab is now in the normal range with TSH 01.4, T4 1.9. Her medication is being reduced to 75 mcg which will likely be her dose going forward. Saba is feeling more focused mentally, less fatigue and ready to now focus on diet and activity with her thyroid function greatly improved. She even notes that her BM  have been more regular. Overall, she is feeling well.  (12/2/23): Follow up for weight loss. Saba has been working with her endocrinologist on managing her thyroid. She is now taking levothyroxine 75 mcg 6 days per week and 1 day off. Her TSH level is below normal and Free T4 is elevated, but improving. She will follow up with Endo this month. He weight is down 11 pounds in 6 weeks. He energy is better, less lethargy. She still feels tried after a long work day, but no longer sleepy. Will continue with current plan, decreasing sweets and recommend eating out less often.  (2/24/24): Follow up for weight loss. Saba returns stating she has been off track since beginning of January due to illness and feeling more tired. She started back to the gym in the past 2 week. Food was more quick options of order out, and more sweets. Portions were not out of control, which is likely the reason her weight is down 4 pounds in the past 2.5 months. She is motivated to make changes to start eating healthier again and continue with her weight loss.  (3/19/24): Follow up for weight loss plan. Saba is feeling well, thyroid levels are close to the normal range. She will have more labs drown in 2 weeks to determine if her thyroid labs are improving and she can stay off the levothyroxine. She is wanting to try the Whole 30 meal plan, but feel emre there is not a good time to avoid specific food for 30 days. Discussed doing a shorter elimination period window and assess how she feels.  (5/7/24): Follow up for weight loss. Saba is feeling well, energy continues to be good most days. She is now exercising more regularly. Her goal is anything from 5 minutes to 60 minutes, as long as she gets out moving. Most activity is walking  and there are 2 days of strength training at the gym. She declined being weighed to day, but will have it checked at next follow up in June.   (6/11/24): Follow up for weight loss. Saba is feeling  well. She is now tracking her food in the [employer] zaki, average intake 9660-7677 calories. She notes that she is eating more sweets either near her menstrual cycle or when she is stressed at work. Discussed taking notes and seeing the motivation behind her eating to help make changes.   (7/23/24): Saba returns for follow up on weight reducing diet and lifestyle. She is feeling positive about changes to how she is eating in the past month. Her focus has been on having a good meal at breakfast and lunch and then she feels full and is less inclined to snack. She wants to add exercise back into her plan, which has not been happening recently. Overall, she is feeling motivated to continue to improve her health.  (10/29/24): follow up for weight loss. Saba weight has increased to 239. Her Grave disease is being managed by her endocrinologist and she feels better overall. Now she wants to focus more on her diet. She is stressed at her job and realizes that she will crave sweets as a result. At home, she is able to make better food choices and not have sweet cravings. Discussed adjusting her diet to add more protein at each meal. Also recommend add regular exercise, this is infrequent now. Saba does like strength training and has equipment at home, so this would be an easier option then going to the gym to do cardio.  (12/7/24) Follow up for weight loss. Saba feels she is making better food choices, not snacking as frequently. Added oatmeal with nuts and berries in the morning, have chicken or other protein at lunch. Often she is not hungry at dinner and may just have something mall with her medication. Recommended focusing on protein at meals so she is not under eating nutrients and possibly losing muscle vs fat. She is also trying to be more active, using [facility] workouts for 30 minutes at home when she does not go to the gym.      PHYSICAL ACTIVITY  Type: Cardio or strength  Frequency 3-5 days per  week  Physical Assessment: Recommend add 30-60 minutes of activiy daily     Nutrition Diagnosis: Obesity due to excess ntake as evidenced by BMI > 40     Intervention      Nutrition Education: Recommended Modification  Nutrition/Diet Handouts Given: Weight Loss Diet Handouts:  Weight Management Packet Includes: Calorie/Carb Counting, Portion Size Control, Food Label Reading and Food Journal        NUTRITION PRESCRIPTION:                   Needs:  1500 Calorie       gm Protein                   Oral Diet Prescription: Balanced CHO 1500 Calorie        Goals      Nutrition Goals:   1. Increase protein intake to  grams per day.  2. Focus on food order: eat protein and vegetables first and then fruit or starches.  3. Exercise:add strength training 2-3 days per week.   4. Use [employer] zaki to identify foods free of harmful additives.       Recommendation to MD: none at this time     Assessment of Ability/Barriers      Patient and/or Family Will: Verbalize Understanding     Patient and/or Family Ability to Learn: Retain Information     Readiness to Learn: Motivated     Barriers to Learning: None           12/7/2024  Juliann Dick RD

## 2024-12-14 ENCOUNTER — PATIENT MESSAGE (OUTPATIENT)
Dept: INTERNAL MEDICINE CLINIC | Facility: CLINIC | Age: 44
End: 2024-12-14

## 2024-12-14 DIAGNOSIS — H69.93 DYSFUNCTION OF BOTH EUSTACHIAN TUBES: ICD-10-CM

## 2024-12-16 RX ORDER — FLUTICASONE PROPIONATE 50 MCG
2 SPRAY, SUSPENSION (ML) NASAL DAILY
Qty: 11.1 ML | Refills: 0 | Status: SHIPPED | OUTPATIENT
Start: 2024-12-16 | End: 2025-12-11

## 2024-12-16 NOTE — TELEPHONE ENCOUNTER
Last time medication was refilled 2/14/23  Last office visit  11/15/24  Next office visit due/scheduled 2/14/25

## 2025-02-12 NOTE — PROGRESS NOTES
Saba Roach is a 44 year old female.  HPI:   HPI   Pt follows with cardiology. BP medication are switched for safety of future pregnancy. Adherent to medication. Denies side effects.      Follows with endo for Grave's disease. Recently, Propylthiouracil medication dose was increased. Pt states she needs to repeat labs next month. With this increased dose pt states she feels better, has more energy.    Current Outpatient Medications   Medication Sig Dispense Refill    propylthiouracil 50 MG Oral Tab Take by mouth 3 (three) times daily.      fluticasone propionate 50 MCG/ACT Nasal Suspension 2 sprays by Each Nare route daily. 11.1 mL 0    NIFEdipine ER 30 MG Oral Tablet 24 Hr Take 1 tablet (30 mg total) by mouth daily.      labetalol 100 MG Oral Tab Take 1 tablet (100 mg total) by mouth 2 (two) times daily. Managed by cardiologist Dr. Arce      FOLIC ACID OR Take by mouth.      omega-3 fatty acids 1000 MG Oral Cap Take 1,000 mg by mouth daily.      Cholecalciferol (VITAMIN D) 125 MCG (5000 UT) Oral Cap       Probiotic Product (PRO-BIOTIC BLEND) Oral Cap       Cyanocobalamin (VITAMIN B 12 OR) Take by mouth daily.        Calcium Carbonate 1500 (600 Ca) MG Oral Tab Take 250 mg by mouth daily.      prenatal multivitamin plus DHA 27-0.8-228 MG Oral Cap Take 1 capsule by mouth daily.        Past Medical History:    Allergic rhinitis    Anxiety    Autoimmune thyroiditis    Essential hypertension    Hyperthyroidism    Hypothyroidism    IBS (irritable bowel syndrome)    Obesity    Tendinitis    L hand      Social History:  Social History     Socioeconomic History    Marital status:    Tobacco Use    Smoking status: Never     Passive exposure: Never    Smokeless tobacco: Never   Vaping Use    Vaping status: Never Used   Substance and Sexual Activity    Alcohol use: Not Currently    Drug use: Never    Sexual activity: Yes     Partners: Male   Other Topics Concern    Caffeine Concern No    Exercise Yes    Seat Belt  Yes    Special Diet Yes    Stress Concern Yes    Weight Concern Yes        REVIEW OF SYSTEMS:   GENERAL HEALTH: feels well otherwise. Denies fever, chills, unintentional weight change  SKIN: denies any unusual skin lesions or rashes  RESPIRATORY: denies shortness of breath with exertion, denies cough or wheezing  CARDIOVASCULAR: denies chest pain or palpitations, denies leg swelling  GI: denies abdominal pain and denies heartburn. Denies nausea, vomiting, diarrhea, constipation  NEURO: denies headaches, dizziness, weakness, syncope  PSYCH: denies anxiety, depression, insomnia    EXAM:   /80   Pulse 78   Temp 97.8 °F (36.6 °C)   Resp 14   Ht 5' 2\" (1.575 m)   Wt 240 lb (108.9 kg)   LMP 02/06/2025 (Exact Date)   SpO2 98%   BMI 43.90 kg/m²   GENERAL: well developed, well nourished,in no apparent distress  SKIN: no rashes,no suspicious lesions, warm and dry  HEENT: atraumatic, normocephalic,ears and throat are clear  NECK: supple,no adenopathy, no thyromegaly  LUNGS: clear to auscultation b/l no W/R/R  CARDIO: RRR without murmur  GI: good BS's,no masses, HSM, distension or tenderness  EXTREMITIES: no cyanosis, clubbing or edema  MUSCULOSKELETAL: FROM, no joint swelling or bony tenderness  NEURO: a/ox3, no focal deficits  PSYCH: mood and affect normal    ASSESSMENT AND PLAN:     Encounter Diagnoses   Name Primary?    Hypertension due to endocrine disorder  -controlled; CPM Yes    Hyperthyroidism  -uncontrolled; defer to endo      Requested Prescriptions      No prescriptions requested or ordered in this encounter         The patient indicates understanding of these issues and agrees to the plan.  The patient is asked to return in 9m for annual physical.

## 2025-02-13 LAB
ABSOLUTE BASOPHILS: 19 CELLS/UL (ref 0–200)
ABSOLUTE EOSINOPHILS: 62 CELLS/UL (ref 15–500)
ABSOLUTE LYMPHOCYTES: 1502 CELLS/UL (ref 850–3900)
ABSOLUTE MONOCYTES: 394 CELLS/UL (ref 200–950)
ABSOLUTE NEUTROPHILS: 2822 CELLS/UL (ref 1500–7800)
ALBUMIN/GLOBULIN RATIO: 1.6 (CALC) (ref 1–2.5)
ALBUMIN: 4.3 G/DL (ref 3.6–5.1)
ALKALINE PHOSPHATASE: 91 U/L (ref 31–125)
ALT: 23 U/L (ref 6–29)
APPEARANCE: CLEAR
AST: 15 U/L (ref 10–30)
BASOPHILS: 0.4 %
BILIRUBIN, TOTAL: 0.4 MG/DL (ref 0.2–1.2)
BILIRUBIN: NEGATIVE
BUN: 14 MG/DL (ref 7–25)
CALCIUM: 9.3 MG/DL (ref 8.6–10.2)
CARBON DIOXIDE: 27 MMOL/L (ref 20–32)
CHLORIDE: 104 MMOL/L (ref 98–110)
CHOL/HDLC RATIO: 2.8 (CALC)
CHOLESTEROL, TOTAL: 205 MG/DL
COLOR: YELLOW
CREATININE: 0.61 MG/DL (ref 0.5–0.99)
EGFR: 114 ML/MIN/1.73M2
EOSINOPHILS: 1.3 %
GLOBULIN: 2.7 G/DL (CALC) (ref 1.9–3.7)
GLUCOSE: 87 MG/DL (ref 65–99)
GLUCOSE: NEGATIVE
HDL CHOLESTEROL: 72 MG/DL
HEMATOCRIT: 39.9 % (ref 35–45)
HEMOGLOBIN: 13 G/DL (ref 11.7–15.5)
LDL-CHOLESTEROL: 118 MG/DL (CALC)
LEUKOCYTE ESTERASE: NEGATIVE
LYMPHOCYTES: 31.3 %
MCH: 26.5 PG (ref 27–33)
MCHC: 32.6 G/DL (ref 32–36)
MCV: 81.4 FL (ref 80–100)
MONOCYTES: 8.2 %
MPV: 9.5 FL (ref 7.5–12.5)
NEUTROPHILS: 58.8 %
NITRITE: NEGATIVE
NON-HDL CHOLESTEROL: 133 MG/DL (CALC)
OCCULT BLOOD: NEGATIVE
PH: 5.5 (ref 5–8)
PLATELET COUNT: 367 THOUSAND/UL (ref 140–400)
POTASSIUM: 3.4 MMOL/L (ref 3.5–5.3)
PROTEIN, TOTAL: 7 G/DL (ref 6.1–8.1)
PROTEIN: NEGATIVE
RDW: 14 % (ref 11–15)
RED BLOOD CELL COUNT: 4.9 MILLION/UL (ref 3.8–5.1)
SODIUM: 137 MMOL/L (ref 135–146)
SPECIFIC GRAVITY: 1.01 (ref 1–1.03)
TRIGLYCERIDES: 63 MG/DL
VITAMIN D, 25-OH, TOTAL: 41 NG/ML (ref 30–100)
WHITE BLOOD CELL COUNT: 4.8 THOUSAND/UL (ref 3.8–10.8)

## 2025-02-14 ENCOUNTER — OFFICE VISIT (OUTPATIENT)
Dept: INTERNAL MEDICINE CLINIC | Facility: CLINIC | Age: 45
End: 2025-02-14
Payer: COMMERCIAL

## 2025-02-14 VITALS
OXYGEN SATURATION: 98 % | SYSTOLIC BLOOD PRESSURE: 132 MMHG | HEART RATE: 78 BPM | BODY MASS INDEX: 44.16 KG/M2 | DIASTOLIC BLOOD PRESSURE: 80 MMHG | RESPIRATION RATE: 14 BRPM | WEIGHT: 240 LBS | HEIGHT: 62 IN | TEMPERATURE: 98 F

## 2025-02-14 DIAGNOSIS — I15.2 HYPERTENSION DUE TO ENDOCRINE DISORDER: Primary | ICD-10-CM

## 2025-02-14 DIAGNOSIS — E05.90 HYPERTHYROIDISM: ICD-10-CM

## 2025-02-14 PROCEDURE — 99214 OFFICE O/P EST MOD 30 MIN: CPT

## 2025-02-14 RX ORDER — PROPYLTHIOURACIL 50 MG/1
TABLET ORAL 3 TIMES DAILY
COMMUNITY

## 2025-08-15 PROBLEM — E05.00 GRAVES DISEASE: Status: ACTIVE | Noted: 2025-06-10

## 2025-08-15 PROBLEM — I10 PRIMARY HYPERTENSION: Status: RESOLVED | Noted: 2023-06-15 | Resolved: 2025-08-15

## 2025-08-18 ENCOUNTER — OFFICE VISIT (OUTPATIENT)
Dept: INTERNAL MEDICINE CLINIC | Facility: CLINIC | Age: 45
End: 2025-08-18

## 2025-08-18 VITALS
DIASTOLIC BLOOD PRESSURE: 85 MMHG | OXYGEN SATURATION: 99 % | HEART RATE: 72 BPM | HEIGHT: 62.5 IN | RESPIRATION RATE: 18 BRPM | TEMPERATURE: 98 F | WEIGHT: 247.38 LBS | BODY MASS INDEX: 44.39 KG/M2 | SYSTOLIC BLOOD PRESSURE: 135 MMHG

## 2025-08-18 DIAGNOSIS — E05.90 HYPERTHYROIDISM: ICD-10-CM

## 2025-08-18 DIAGNOSIS — I15.2 HYPERTENSION DUE TO ENDOCRINE DISORDER: Primary | ICD-10-CM

## 2025-08-18 PROCEDURE — 99214 OFFICE O/P EST MOD 30 MIN: CPT

## (undated) NOTE — LETTER
Date & Time: 4/15/2019, 6:53 PM  Patient: Enriqueta Peterson  Encounter Provider(s):    Jatinder Cordero MD       To Whom It May Concern:    Hudson Rubio was seen and treated in our department on 4/15/2019.  She is unable to return to work until 4/17/

## (undated) NOTE — LETTER
Date: 1/2/2020    Patient Name: Enriqueta Peterson          To Whom it may concern: The above patient was seen at the [facility] for treatment of a medical condition.     This patient should be excused from attending work from 1/2/2020 eliazaru

## (undated) NOTE — MR AVS SNAPSHOT
[facility] [address]  [address] [place] [address] Road  [phone]               Thank you for choosing us for your health care visit with ALTHEA Romero.   We are glad to serve you and happy to provide you with this summary of your semicircular canals help maintain balance. The vestibular nerve carries balance signals to the brain. The auditory nerve carries sound signals to the brain.  The cochlea picks up sound waves and makes nerve signals.     Date Last Reviewed: 10/1/2016  © 2000 not sign up before the expiration date, you must request a new code. Your unique [employer] Access Code: [email or web address]  Expires: 4/30/2017  6:37 PM    If you have questions, you can call [phone] to talk to our [facility] Staff.  Remember, [employer]